# Patient Record
Sex: MALE | Race: WHITE | Employment: UNEMPLOYED | URBAN - METROPOLITAN AREA
[De-identification: names, ages, dates, MRNs, and addresses within clinical notes are randomized per-mention and may not be internally consistent; named-entity substitution may affect disease eponyms.]

---

## 2019-06-25 ENCOUNTER — TRANSFERRED RECORDS (OUTPATIENT)
Dept: HEALTH INFORMATION MANAGEMENT | Facility: CLINIC | Age: 54
End: 2019-06-25

## 2019-09-19 ENCOUNTER — HOSPITAL ENCOUNTER (INPATIENT)
Facility: CLINIC | Age: 54
LOS: 5 days | Discharge: ANOTHER HOSPITAL | DRG: 091 | End: 2019-09-24
Attending: NEUROLOGICAL SURGERY | Admitting: NEUROLOGICAL SURGERY
Payer: COMMERCIAL

## 2019-09-19 DIAGNOSIS — R25.2 SPASTICITY: Primary | ICD-10-CM

## 2019-09-19 LAB
ALBUMIN SERPL-MCNC: 3.3 G/DL (ref 3.4–5)
ALBUMIN UR-MCNC: NEGATIVE MG/DL
ALP SERPL-CCNC: 85 U/L (ref 40–150)
ALT SERPL W P-5'-P-CCNC: 32 U/L (ref 0–70)
AMORPH CRY #/AREA URNS HPF: ABNORMAL /HPF
ANION GAP SERPL CALCULATED.3IONS-SCNC: 7 MMOL/L (ref 3–14)
APPEARANCE UR: CLEAR
AST SERPL W P-5'-P-CCNC: 15 U/L (ref 0–45)
BILIRUB SERPL-MCNC: 0.4 MG/DL (ref 0.2–1.3)
BILIRUB UR QL STRIP: NEGATIVE
BUN SERPL-MCNC: 11 MG/DL (ref 7–30)
CALCIUM SERPL-MCNC: 9 MG/DL (ref 8.5–10.1)
CHLORIDE SERPL-SCNC: 106 MMOL/L (ref 94–109)
CO2 SERPL-SCNC: 24 MMOL/L (ref 20–32)
COLOR UR AUTO: ABNORMAL
CREAT SERPL-MCNC: 0.68 MG/DL (ref 0.66–1.25)
CRP SERPL-MCNC: 42 MG/L (ref 0–8)
ERYTHROCYTE [DISTWIDTH] IN BLOOD BY AUTOMATED COUNT: 13.4 % (ref 10–15)
GFR SERPL CREATININE-BSD FRML MDRD: >90 ML/MIN/{1.73_M2}
GLUCOSE SERPL-MCNC: 90 MG/DL (ref 70–99)
GLUCOSE UR STRIP-MCNC: NEGATIVE MG/DL
HCT VFR BLD AUTO: 43.5 % (ref 40–53)
HGB BLD-MCNC: 14 G/DL (ref 13.3–17.7)
HGB UR QL STRIP: NEGATIVE
INR PPP: 1.03 (ref 0.86–1.14)
KETONES UR STRIP-MCNC: NEGATIVE MG/DL
LEUKOCYTE ESTERASE UR QL STRIP: ABNORMAL
MCH RBC QN AUTO: 29.8 PG (ref 26.5–33)
MCHC RBC AUTO-ENTMCNC: 32.2 G/DL (ref 31.5–36.5)
MCV RBC AUTO: 93 FL (ref 78–100)
NITRATE UR QL: NEGATIVE
PH UR STRIP: 5.5 PH (ref 5–7)
PLATELET # BLD AUTO: 315 10E9/L (ref 150–450)
POTASSIUM SERPL-SCNC: 3.8 MMOL/L (ref 3.4–5.3)
PROT SERPL-MCNC: 7.5 G/DL (ref 6.8–8.8)
RBC # BLD AUTO: 4.7 10E12/L (ref 4.4–5.9)
RBC #/AREA URNS AUTO: <1 /HPF (ref 0–2)
SODIUM SERPL-SCNC: 137 MMOL/L (ref 133–144)
SOURCE: ABNORMAL
SP GR UR STRIP: 1.01 (ref 1–1.03)
SQUAMOUS #/AREA URNS AUTO: <1 /HPF (ref 0–1)
UROBILINOGEN UR STRIP-MCNC: NORMAL MG/DL (ref 0–2)
WBC # BLD AUTO: 9.8 10E9/L (ref 4–11)
WBC #/AREA URNS AUTO: 32 /HPF (ref 0–5)
WBC CLUMPS #/AREA URNS HPF: PRESENT /HPF

## 2019-09-19 PROCEDURE — 87186 SC STD MICRODIL/AGAR DIL: CPT | Performed by: NEUROLOGICAL SURGERY

## 2019-09-19 PROCEDURE — 81001 URINALYSIS AUTO W/SCOPE: CPT | Performed by: NURSE PRACTITIONER

## 2019-09-19 PROCEDURE — 85610 PROTHROMBIN TIME: CPT | Performed by: NEUROLOGICAL SURGERY

## 2019-09-19 PROCEDURE — 87088 URINE BACTERIA CULTURE: CPT | Performed by: NEUROLOGICAL SURGERY

## 2019-09-19 PROCEDURE — 36415 COLL VENOUS BLD VENIPUNCTURE: CPT | Performed by: NURSE PRACTITIONER

## 2019-09-19 PROCEDURE — 25000132 ZZH RX MED GY IP 250 OP 250 PS 637: Performed by: STUDENT IN AN ORGANIZED HEALTH CARE EDUCATION/TRAINING PROGRAM

## 2019-09-19 PROCEDURE — 85027 COMPLETE CBC AUTOMATED: CPT | Performed by: NURSE PRACTITIONER

## 2019-09-19 PROCEDURE — 80053 COMPREHEN METABOLIC PANEL: CPT | Performed by: NURSE PRACTITIONER

## 2019-09-19 PROCEDURE — 25000132 ZZH RX MED GY IP 250 OP 250 PS 637: Performed by: NURSE PRACTITIONER

## 2019-09-19 PROCEDURE — 12000001 ZZH R&B MED SURG/OB UMMC

## 2019-09-19 PROCEDURE — 86140 C-REACTIVE PROTEIN: CPT | Performed by: NURSE PRACTITIONER

## 2019-09-19 PROCEDURE — 87086 URINE CULTURE/COLONY COUNT: CPT | Performed by: NEUROLOGICAL SURGERY

## 2019-09-19 RX ORDER — MORPHINE SULFATE 15 MG/1
15 TABLET, FILM COATED, EXTENDED RELEASE ORAL 2 TIMES DAILY
Status: DISCONTINUED | OUTPATIENT
Start: 2019-09-19 | End: 2019-09-19

## 2019-09-19 RX ORDER — DIAZEPAM 10 MG
10 TABLET ORAL AT BEDTIME
COMMUNITY

## 2019-09-19 RX ORDER — HYDROXYZINE HYDROCHLORIDE 25 MG/1
25-50 TABLET, FILM COATED ORAL EVERY 6 HOURS PRN
Status: DISCONTINUED | OUTPATIENT
Start: 2019-09-19 | End: 2019-09-19

## 2019-09-19 RX ORDER — LIDOCAINE HYDROCHLORIDE 20 MG/ML
JELLY TOPICAL PRN
Status: ON HOLD | COMMUNITY
End: 2019-09-24

## 2019-09-19 RX ORDER — BACLOFEN 20 MG/1
20 TABLET ORAL 4 TIMES DAILY PRN
COMMUNITY

## 2019-09-19 RX ORDER — PANTOPRAZOLE SODIUM 40 MG/1
40 TABLET, DELAYED RELEASE ORAL DAILY
Status: DISCONTINUED | OUTPATIENT
Start: 2019-09-20 | End: 2019-09-24 | Stop reason: HOSPADM

## 2019-09-19 RX ORDER — NALOXONE HYDROCHLORIDE 0.4 MG/ML
.1-.4 INJECTION, SOLUTION INTRAMUSCULAR; INTRAVENOUS; SUBCUTANEOUS
Status: DISCONTINUED | OUTPATIENT
Start: 2019-09-19 | End: 2019-09-24 | Stop reason: HOSPADM

## 2019-09-19 RX ORDER — LANOLIN ALCOHOL/MO/W.PET/CERES
1000 CREAM (GRAM) TOPICAL DAILY
Status: DISCONTINUED | OUTPATIENT
Start: 2019-09-19 | End: 2019-09-19

## 2019-09-19 RX ORDER — DOXEPIN HYDROCHLORIDE 150 MG/1
150 CAPSULE ORAL AT BEDTIME
COMMUNITY

## 2019-09-19 RX ORDER — SUMATRIPTAN 100 MG/1
100 TABLET, FILM COATED ORAL 2 TIMES DAILY PRN
COMMUNITY

## 2019-09-19 RX ORDER — LACTULOSE 10 G/15ML
30 SOLUTION ORAL 2 TIMES DAILY PRN
Status: DISCONTINUED | OUTPATIENT
Start: 2019-09-19 | End: 2019-09-24 | Stop reason: HOSPADM

## 2019-09-19 RX ORDER — HYDROMORPHONE HYDROCHLORIDE 2 MG/1
6 TABLET ORAL 2 TIMES DAILY PRN
COMMUNITY

## 2019-09-19 RX ORDER — SUMATRIPTAN 100 MG/1
100 TABLET, FILM COATED ORAL 2 TIMES DAILY PRN
Status: DISCONTINUED | OUTPATIENT
Start: 2019-09-19 | End: 2019-09-24 | Stop reason: HOSPADM

## 2019-09-19 RX ORDER — DIAZEPAM 5 MG
10 TABLET ORAL AT BEDTIME
Status: DISCONTINUED | OUTPATIENT
Start: 2019-09-19 | End: 2019-09-24 | Stop reason: HOSPADM

## 2019-09-19 RX ORDER — GUAIFENESIN 600 MG/1
600 TABLET, EXTENDED RELEASE ORAL 2 TIMES DAILY
COMMUNITY

## 2019-09-19 RX ORDER — BACLOFEN 10 MG/1
20 TABLET ORAL 4 TIMES DAILY PRN
Status: DISCONTINUED | OUTPATIENT
Start: 2019-09-19 | End: 2019-09-24

## 2019-09-19 RX ORDER — TOPIRAMATE 25 MG/1
25 TABLET, FILM COATED ORAL 2 TIMES DAILY
COMMUNITY

## 2019-09-19 RX ORDER — LIDOCAINE HYDROCHLORIDE 20 MG/ML
JELLY TOPICAL DAILY PRN
Status: DISCONTINUED | OUTPATIENT
Start: 2019-09-19 | End: 2019-09-24 | Stop reason: HOSPADM

## 2019-09-19 RX ORDER — DOXEPIN HYDROCHLORIDE 50 MG/1
150 CAPSULE ORAL AT BEDTIME
Status: DISCONTINUED | OUTPATIENT
Start: 2019-09-19 | End: 2019-09-24 | Stop reason: HOSPADM

## 2019-09-19 RX ORDER — GABAPENTIN 100 MG/1
100 CAPSULE ORAL 3 TIMES DAILY
Status: DISCONTINUED | OUTPATIENT
Start: 2019-09-19 | End: 2019-09-19

## 2019-09-19 RX ORDER — POLYETHYLENE GLYCOL 3350 17 G/17G
17 POWDER, FOR SOLUTION ORAL DAILY
Status: DISCONTINUED | OUTPATIENT
Start: 2019-09-20 | End: 2019-09-24 | Stop reason: HOSPADM

## 2019-09-19 RX ORDER — LIDOCAINE 4 G/G
1 PATCH TOPICAL
Status: DISCONTINUED | OUTPATIENT
Start: 2019-09-20 | End: 2019-09-24 | Stop reason: HOSPADM

## 2019-09-19 RX ORDER — ALBUTEROL SULFATE 0.83 MG/ML
2.5 SOLUTION RESPIRATORY (INHALATION) EVERY 4 HOURS PRN
Status: DISCONTINUED | OUTPATIENT
Start: 2019-09-19 | End: 2019-09-24 | Stop reason: HOSPADM

## 2019-09-19 RX ORDER — DULOXETIN HYDROCHLORIDE 60 MG/1
60 CAPSULE, DELAYED RELEASE ORAL DAILY
Status: DISCONTINUED | OUTPATIENT
Start: 2019-09-20 | End: 2019-09-24 | Stop reason: HOSPADM

## 2019-09-19 RX ORDER — ACETAMINOPHEN 325 MG/1
325-650 TABLET ORAL EVERY 6 HOURS PRN
Status: DISCONTINUED | OUTPATIENT
Start: 2019-09-19 | End: 2019-09-24 | Stop reason: HOSPADM

## 2019-09-19 RX ORDER — MULTIPLE VITAMINS W/ MINERALS TAB 9MG-400MCG
1 TAB ORAL DAILY
Status: DISCONTINUED | OUTPATIENT
Start: 2019-09-20 | End: 2019-09-24 | Stop reason: HOSPADM

## 2019-09-19 RX ORDER — AMOXICILLIN 250 MG
1 CAPSULE ORAL EVERY EVENING
COMMUNITY

## 2019-09-19 RX ORDER — LIDOCAINE 4 G/G
1 PATCH TOPICAL EVERY 24 HOURS
Status: ON HOLD | COMMUNITY
End: 2019-09-24

## 2019-09-19 RX ORDER — OMEPRAZOLE/SODIUM BICARBONATE 20MG-1.1G
1 CAPSULE ORAL DAILY
Status: DISCONTINUED | OUTPATIENT
Start: 2019-09-19 | End: 2019-09-19

## 2019-09-19 RX ORDER — GUAIFENESIN 600 MG/1
600 TABLET, EXTENDED RELEASE ORAL 2 TIMES DAILY
Status: DISCONTINUED | OUTPATIENT
Start: 2019-09-19 | End: 2019-09-24 | Stop reason: HOSPADM

## 2019-09-19 RX ORDER — ZOLPIDEM TARTRATE 5 MG/1
5 TABLET ORAL AT BEDTIME
Status: DISCONTINUED | OUTPATIENT
Start: 2019-09-19 | End: 2019-09-19

## 2019-09-19 RX ORDER — OXYCODONE HYDROCHLORIDE 10 MG/1
10-15 TABLET ORAL EVERY 4 HOURS PRN
Status: DISCONTINUED | OUTPATIENT
Start: 2019-09-19 | End: 2019-09-19

## 2019-09-19 RX ORDER — AMOXICILLIN 250 MG
1 CAPSULE ORAL EVERY EVENING
Status: DISCONTINUED | OUTPATIENT
Start: 2019-09-19 | End: 2019-09-19

## 2019-09-19 RX ORDER — LACTULOSE 20 G/30ML
30 SOLUTION ORAL 2 TIMES DAILY PRN
COMMUNITY

## 2019-09-19 RX ORDER — PANTOPRAZOLE SODIUM 40 MG/1
40 TABLET, DELAYED RELEASE ORAL DAILY
COMMUNITY

## 2019-09-19 RX ORDER — DANTROLENE SODIUM 25 MG/1
25 CAPSULE ORAL 2 TIMES DAILY
Status: DISCONTINUED | OUTPATIENT
Start: 2019-09-19 | End: 2019-09-19

## 2019-09-19 RX ORDER — BISACODYL 10 MG
10 SUPPOSITORY, RECTAL RECTAL DAILY PRN
Status: DISCONTINUED | OUTPATIENT
Start: 2019-09-19 | End: 2019-09-24 | Stop reason: HOSPADM

## 2019-09-19 RX ORDER — TRAZODONE HYDROCHLORIDE 50 MG/1
50 TABLET, FILM COATED ORAL AT BEDTIME
Status: DISCONTINUED | OUTPATIENT
Start: 2019-09-19 | End: 2019-09-19

## 2019-09-19 RX ORDER — AMOXICILLIN 250 MG
1 CAPSULE ORAL EVERY EVENING
Status: DISCONTINUED | OUTPATIENT
Start: 2019-09-19 | End: 2019-09-24 | Stop reason: HOSPADM

## 2019-09-19 RX ORDER — TOPIRAMATE 25 MG/1
25 TABLET, FILM COATED ORAL 2 TIMES DAILY
Status: DISCONTINUED | OUTPATIENT
Start: 2019-09-19 | End: 2019-09-24 | Stop reason: HOSPADM

## 2019-09-19 RX ADMIN — DOXEPIN HYDROCHLORIDE 150 MG: 50 CAPSULE ORAL at 21:22

## 2019-09-19 RX ADMIN — SENNOSIDES AND DOCUSATE SODIUM 1 TABLET: 8.6; 5 TABLET ORAL at 21:22

## 2019-09-19 RX ADMIN — BACLOFEN 20 MG: 10 TABLET ORAL at 22:08

## 2019-09-19 RX ADMIN — DIAZEPAM 10 MG: 5 TABLET ORAL at 21:22

## 2019-09-19 RX ADMIN — HYDROMORPHONE HYDROCHLORIDE 6 MG: 4 TABLET ORAL at 18:26

## 2019-09-19 RX ADMIN — GUAIFENESIN 600 MG: 600 TABLET ORAL at 21:22

## 2019-09-19 RX ADMIN — TOPIRAMATE 25 MG: 25 TABLET, FILM COATED ORAL at 21:24

## 2019-09-19 RX ADMIN — ACETAMINOPHEN 650 MG: 325 TABLET, FILM COATED ORAL at 18:26

## 2019-09-19 ASSESSMENT — ACTIVITIES OF DAILY LIVING (ADL)
ADLS_ACUITY_SCORE: 29
ADLS_ACUITY_SCORE: 29

## 2019-09-19 ASSESSMENT — MIFFLIN-ST. JEOR: SCORE: 1825.15

## 2019-09-19 NOTE — H&P
[facility]       NEUROSURGERY H&P NOTE    HPI: Osvaldo Baxter is a 53 year old man with a past medical history of cervical spinal cord injury in 2005 causing tetraplegia and spasticity s/p baclofen pump placement around that time. He has had multiple recent revisions who was [facility] concerns of possible baclofen withdrawal. At baseline he has spasticity in his right arm and leg.    For the past 18 months he has noticed increased spasticity. He originally presented to an outside hospital in [place] May of this year where they were concerned of baclofen withdrawal and interrogated the pump. They deemed it necessary to revise the pump. The pump itself was replaced but the original catheter was left in place. He continued to have increased spasticity as well as a UTI that led to sepsis during that hospitalization. Eventually, when he was medically stabilized, [facility] [place] [facility] were concerns at the [facility] of continued [facility] withdrawal. Several studies were done to assess the integrity of the pump including dye studies that showed the pump was working and the catheter was patent allowing intrathecal administration of the contents in the pump. For concerns of continued increased spasticity, he was taken to the OR on 9/10/19 where his IT catheter was interrogated, the pump aspirated CSF and the tubing appeared to be intact. There was no replacement of the catheter at that time. The [facility] and a transfer here was set up to assess for baclofen withdrawal as well as possible replacement of the pump catheter.    He states he has increased spasticity in his right leg. He states he never used to have spasticity in his left leg but more recently has spasticity that is significant in his left leg. He has his baseline spasticity in his right arm and no subjective  "spasticity in his left arm. He states his back hurts and now has spasticity though he never had spasticity in his back before. He denies itching, or trouble with temperature regulation. He states his only symptoms are his increased \"tone\" which has more recently aggravated his sciatica in his left leg.    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 cruciate ligament 4/1/2013     Quadriplegia (H)      Tear of PCL (posterior cruciate ligament) of knee 4/1/2013       PAST SURGICAL HISTORY:   Past Surgical History:   Procedure Laterality Date     ARTHROSCOPIC RECONSTRUCTION ANTERIOR CRUCIATE LIGAMENT  4/9/2013    Procedure: ARTHROSCOPIC RECONSTRUCTION ANTERIOR CRUCIATE LIGAMENT;  Right Knee Arthroscopic Posterior Cruciate Ligament Reconstruction     ;  Surgeon: Gideon Lloyd MD;  Location:  OR     BACK SURGERY      X 2     EYE SURGERY      RIGHT EYE     INSERT PUMP BACLOFEN       RECESSION RESECTION (REPAIR STRABISMUS) BILATERAL  11/1/2012    Procedure: RECESSION RESECTION (REPAIR STRABISMUS) BILATERAL;  Strabismus Repair Left Eye;  Surgeon: Estrella Cruz MD;  Location:  OR       FAMILY HISTORY: No family history on file.    SOCIAL HISTORY:   Social History     Tobacco Use     Smoking status: Never Smoker   Substance Use Topics     Alcohol use: Yes     Comment: OCCASSIONALLY 4-5 DRINKS PER MONTH       MEDICATIONS:  Medications Prior to Admission   Medication Sig Dispense Refill Last Dose     acetaminophen (TYLENOL) 325 MG tablet Take 1-2 tablets by mouth every 6 hours as needed. 250 tablet 0      albuterol (2.5 MG/3ML) 0.083% nebulizer solution Take 3 mLs by nebulization every 4 hours as needed (dyspnea). 1 Box       baclofen (LIORESAL) 0.05 MG/ML injection 25 mcg by Intrathecal route continuous.     11/1/2012 at Unknown     benzocaine (ANBESOL) 20 % LIQD Place  rectally daily as needed. " Enema given every morning.    4/9/2013 at 0745     bisacodyl (DULCOLAX) 10 MG suppository Place 1 suppository rectally daily as needed. 12 suppository       Cephalexin (KEFLEX PO) Take  by mouth.        cyanocolbalamin (VITAMIN  B-12) 1000 MCG tablet Take 1,000 mcg by mouth daily.   4/9/2013 at 0730     DANTROLENE SODIUM PO Take 25 mg by mouth 2 times daily. Indications: Muscle Spasticity   4/9/2013 at 0730     docusate sodium (ENEMEEZ) 283 MG enema Place 1 enema rectally daily. 30 each       DOCUSATE SODIUM PO Take 100 mg by mouth daily.     4/8/2013 at 2030     DULOXETINE HCL PO Take 60 mg by mouth daily.     4/9/2013 at 0730     gabapentin (NEURONTIN) 100 MG capsule Take 1 capsule by mouth 3 times daily. 270 capsule 0      hydrOXYzine (ATARAX) 25 MG tablet Take 1-2 tablets by mouth every 6 hours as needed (adjuvant pain). 120 tablet 0      lidocaine (XYLOCAINE) 5 % ointment Apply  topically as needed.     Past Week at Unknown     morphine (MS CONTIN) 15 MG 12 hr tablet Take 15 mg by mouth 2 times daily.   4/9/2013 at 0730     Multiple Vitamin (MULTI-VITAMIN) per tablet Take 1 tablet by mouth daily.     4/9/2013 at 0730     nystatin (MYCOSTATIN) 311961 UNIT/ML suspension Take 100,000 Units by mouth as needed.     Past Month at Unknown     Omeprazole-Sodium Bicarbonate  MG CAPS Take 1 capsule by mouth daily.     4/8/2013 at 0730     oxyCODONE (ROXICODONE) 5 MG immediate release tablet Take 2-3 tablets by mouth every 4 hours as needed for pain. 180 tablet 0      polyethylene glycol (MIRALAX) powder Take 1 capful by mouth as needed.   Past Month at Unknown     TRAZODONE HCL PO Take 50 mg by mouth At Bedtime.     4/8/2013 at 2030     VITAMIN D, CHOLECALCIFEROL, PO Take 1,000 Units by mouth daily.     4/9/2013 at 0730     WARFARIN SODIUM PO Take 2.5 mg by mouth. Every Mon/Wed/Fri 4/1/2013     WARFARIN SODIUM PO Take 5 mg by mouth daily. Take every Sat/Sun/Tues/Thursday or as directed by Warfarin Clinic     4/3/13     Warfarin Therapy Reminder 1 each continuous prn. 1 each 0      ZOLPIDEM TARTRATE PO Take 5 mg by mouth At Bedtime. 1-2 at bedtime    4/8/2013 at 2030       Allergies:  No Known Allergies    ROS: 10 point ROS of systems including Constitutional, Eyes, Respiratory, Cardiovascular, Gastroenterology, Genitourinary, Integumentary, Muscularskeletal, Psychiatric were all noncontributory, or are his baseline, except for pertinent positives noted in my HPI.    Physical exam:   Blood pressure 106/77, temperature 98.1  F (36.7  C), temperature source Oral, resp. rate 18, SpO2 98 %.  CV: RRR  PULM: breathing comfortably on room air  NEUROLOGIC:  -- Awake; Alert; oriented x 3  -- Follows commands briskly  -- Speech soft and airy, no aphasia  -- no gaze preference. No apparent hemineglect.  Cranial Nerves:  -- PERRL bilat, extraocular movements intact  -- Right sided facial weakness, able to blink with right eye but cannot raise right eyebrow or right lip  -- hearing decreased in right ear due to history of trauma to the ear    Motor:  Modified Lakhwinder Scale: 4 in LLE, 3 in RLE, 3 in RUE, 1 in LUE  He is 2-3/5 in RUE which is his baseline, 5/5 in LUE, 0-1/5 in RLE, 2-3/5 in LLE    Reflexes:  2+ in left brachial and left biceps, 0 in left brachial, left biceps, right and left patellar.      LABS:  Last Comprehensive Metabolic Panel:  Sodium   Date Value Ref Range Status   04/14/2013 141 133 - 144 mmol/L Final     Potassium   Date Value Ref Range Status   04/14/2013 3.9 3.4 - 5.3 mmol/L Final     Chloride   Date Value Ref Range Status   04/14/2013 106 94 - 109 mmol/L Final     Carbon Dioxide   Date Value Ref Range Status   04/14/2013 24 20 - 32 mmol/L Final     Anion Gap   Date Value Ref Range Status   04/14/2013 11.1 6 - 17 mmol/L Final     Glucose   Date Value Ref Range Status   04/14/2013 97 60 - 99 mg/dL Final     Urea Nitrogen   Date Value Ref Range Status   04/14/2013 7 5 - 24 mg/dL Final     Creatinine   Date  Value Ref Range Status   04/16/2013 0.80 0.66 - 1.25 mg/dL Final     GFR Estimate   Date Value Ref Range Status   04/16/2013 >90 >60 mL/min/1.7m2 Final     Calcium   Date Value Ref Range Status   04/14/2013 8.4 (L) 8.5 - 10.4 mg/dL Final     Lab Results   Component Value Date    WBC 9.4 04/14/2013     Lab Results   Component Value Date    RBC 4.19 04/14/2013     Lab Results   Component Value Date    HGB 12.9 04/14/2013     Lab Results   Component Value Date    HCT 38.5 04/14/2013     Lab Results   Component Value Date    MCV 92 04/14/2013     Lab Results   Component Value Date    MCH 30.8 04/14/2013     Lab Results   Component Value Date    MCHC 33.5 04/14/2013     Lab Results   Component Value Date    RDW 14.1 04/14/2013     Lab Results   Component Value Date     04/14/2013         IMAGING:  No recent imaging in our PACS system     ASSESSMENT:  53 year old male who presents with increased rigidity and spasticity in his extremities, more marked in his lower extremities, with no other signs to suggest baclofen withdrawal.    PLAN:  Will interrogate baclofen pump, will involve [employer] rep for more in depth diagnostics  Further assessment of the pump will depend on what is found when interrogating his pump  Will likely get a contrast study pending pump interrogation  Consult PM&R for Baclofen pump management as well  Hold anticoagulation  Basic labs ordered  Further plan pending pump assessment and further discussion with neurosurgery team    The patient was discussed with Dr. Conde, neurosurgery chief resident, and they agree with the above.    Dorothy Deutsch MD  Neurosurgery Resident, PGY-1    I have reviewed the history above and agree with the resident's assessment and plan.  KELTON Jeffrey MD

## 2019-09-19 NOTE — PLAN OF CARE
[facility] V[facility]. C/O lower back pain, pt repositioned. O x 4, whispers. LUE 3/5 all others 1/5. Reported numbness in RUE and LLE. Slight right facial droop. Back dressing CDI.  [place] with good output, per previous RN had BM today.  NPO since last night (did have sips of water around 1400, NP aware). Able to make needs known. MD came to see patient, awaiting orders at this time.

## 2019-09-19 NOTE — PHARMACY-ADMISSION MEDICATION HISTORY
Admission medication history interview status for the 9/19/2019 admission is complete. See Epic admission navigator for allergy information, pharmacy, prior to admission medications and immunization status.     Medication history interview sources:  [facility]    Changes made to PTA medication list (reason)  Added: Baclofen PO, diazepam, doxepin, guaifenesin, hydromorphone, lactulose, lidocaine patch, miconazole powder, nitroglycerin ointment, pantoprazole, phenylephrine suppository, Proctofoam, Ocean spray, sumatriptan, topiramate, enoxaparin  Deleted: Zolpidem, 2 duplicate warfarin entries, benzocaine rectal liquid, cephalexin, B12, dantrolene, gabapentin, hydroxyzine, MS Contin, nystatin suspension, omeprazole-sodium bicarb, oxycodone, trazodone.  Changed: Docusate to senna+docusate, updated baclofen pump information based on [facility] records, docusate enema from every day to every day PRN, lidocaine 5% ointment to 2% jelly, Miralax from PRN to every day, cholecalciferol 1000 units every day to 2000 units every day     Additional medication history information (including reliability of information, actions taken by pharmacist): Informed RN to let resident know to consult PM & R for baclofen pump interrogation. Warfarin is currently on hold.      Prior to Admission medications    Medication Sig Last Dose Taking? Auth Provider   acetaminophen (TYLENOL) 325 MG tablet Take 1-2 tablets by mouth every 6 hours as needed.  Yes Colten Goode MD   baclofen (LIORESAL) 20 MG tablet Take 20 mg by mouth 4 times daily as needed for muscle spasms (Breakthrough spasms)  Yes Unknown, Entered By History   diazepam (VALIUM) 10 MG tablet Take 10 mg by mouth At Bedtime 9/18/2019 at Unknown time Yes Unknown, Entered By History   docusate sodium (ENEMEEZ) 283 MG enema Place 1 enema rectally daily.  Patient taking differently: Place 1 enema rectally daily as needed for constipation   Yes Neil Rader MD   doxepin HCl  (SINEQUAN) 150 MG capsule Take 150 mg by mouth At Bedtime 9/18/2019 at Unknown time Yes Unknown, Entered By History   DULOXETINE HCL PO Take 60 mg by mouth daily.   9/19/2019 at Unknown time Yes Reported, Patient   enoxaparin (LOVENOX) 80 MG/0.8ML syringe Inject 80 mg Subcutaneous every 12 hours 9/19/2019 at 0830 Yes Unknown, Entered By History   guaiFENesin (MUCINEX) 600 MG 12 hr tablet Take 600 mg by mouth 2 times daily 9/19/2019 at x1 Yes Unknown, Entered By History   HYDROmorphone (DILAUDID) 2 MG tablet Take 6 mg by mouth 2 times daily as needed for severe pain  Yes Unknown, Entered By History   lactulose 20 GM/30ML SOLN Take 30 mLs by mouth 2 times daily as needed  Yes Unknown, Entered By History   Lidocaine (LIDOCARE) 4 % Patch Place 1 patch onto the skin every 24 hours To prevent lidocaine toxicity, patient should be patch free for 12 hrs daily. Apply to lower back 9/19/2019 at am Yes Unknown, Entered By History   lidocaine (XYLOCAINE) 2 % external gel Apply topically as needed for moderate pain (Urethra or rectum PRN)  Yes Unknown, Entered By History   MEDICATION GIVEN BY INTRATHECAL PUMP - INSTRUCTION continuous prn As of 9/19/19 rate was up to 125 mcg/day. Strength = 1000 mcg/mL BACLOFEN 9/19/2019 at Unknown time Yes Unknown, Entered By History   miconazole (MICATIN) 2 % AERP powder Apply topically 2 times daily as needed  Yes Unknown, Entered By History   Multiple Vitamin (MULTI-VITAMIN) per tablet Take 1 tablet by mouth daily.   9/19/2019 at Unknown time Yes Reported, Patient   nitroGLYcerin (NITRO-BID) 2 % OINT ointment Place 1 inch onto the skin as needed for chest pain  Yes Unknown, Entered By History   pantoprazole (PROTONIX) 40 MG EC tablet Take 40 mg by mouth daily 9/19/2019 at Unknown time Yes Unknown, Entered By History   phenylephrine (TRONOLANE) 0.25 % suppository Place 1 suppository rectally daily as needed for hemorrhoids  Yes Unknown, Entered By History   polyethylene glycol (MIRALAX) powder  Take 1 capful by mouth daily  9/19/2019 at Unknown time Yes Reported, Patient   pramoxine - hydrocortisone (PROCTOFOAM-hc) 1 % foam 2 times daily 9/19/2019 at x1 Yes Unknown, Entered By History   senna-docusate (SENOKOT-S/PERICOLACE) 8.6-50 MG tablet Take 1 tablet by mouth every evening 9/18/2019 at Unknown time Yes Unknown, Entered By History   sodium chloride (OCEAN) 0.65 % nasal spray Spray 2 sprays into both nostrils every 2 hours as needed for congestion  Yes Unknown, Entered By History   SUMAtriptan (IMITREX) 100 MG tablet Take 100 mg by mouth 2 times daily as needed for migraine  Yes Unknown, Entered By History   topiramate (TOPAMAX) 25 MG tablet Take 25 mg by mouth 2 times daily 9/19/2019 at x1 Yes Unknown, Entered By History   VITAMIN D, CHOLECALCIFEROL, PO Take 2,000 Units by mouth daily  9/19/2019 at Unknown time Yes Reported, Patient   albuterol (2.5 MG/3ML) 0.083% nebulizer solution Take 3 mLs by nebulization every 4 hours as needed (dyspnea). Unknown at Unknown time  Neil Rader MD   bisacodyl (DULCOLAX) 10 MG suppository Place 1 suppository rectally daily as needed. Unknown at Unknown time  Neil Rader MD   Warfarin Therapy Reminder 1 each continuous prn.   Colten Goode MD         Medication history completed by: Whitley South, Rome[place]

## 2019-09-20 ENCOUNTER — APPOINTMENT (OUTPATIENT)
Dept: PHYSICAL THERAPY | Facility: CLINIC | Age: 54
DRG: 091 | End: 2019-09-20
Attending: NURSE PRACTITIONER
Payer: COMMERCIAL

## 2019-09-20 ENCOUNTER — APPOINTMENT (OUTPATIENT)
Dept: CT IMAGING | Facility: CLINIC | Age: 54
DRG: 091 | End: 2019-09-20
Attending: NEUROLOGICAL SURGERY
Payer: COMMERCIAL

## 2019-09-20 ENCOUNTER — APPOINTMENT (OUTPATIENT)
Dept: OCCUPATIONAL THERAPY | Facility: CLINIC | Age: 54
DRG: 091 | End: 2019-09-20
Attending: NURSE PRACTITIONER
Payer: COMMERCIAL

## 2019-09-20 LAB
ANION GAP SERPL CALCULATED.3IONS-SCNC: 8 MMOL/L (ref 3–14)
BASOPHILS # BLD AUTO: 0.2 10E9/L (ref 0–0.2)
BASOPHILS NFR BLD AUTO: 1.7 %
BUN SERPL-MCNC: 12 MG/DL (ref 7–30)
CALCIUM SERPL-MCNC: 8.9 MG/DL (ref 8.5–10.1)
CHLORIDE SERPL-SCNC: 106 MMOL/L (ref 94–109)
CO2 SERPL-SCNC: 24 MMOL/L (ref 20–32)
CREAT SERPL-MCNC: 0.86 MG/DL (ref 0.66–1.25)
DIFFERENTIAL METHOD BLD: NORMAL
EOSINOPHIL # BLD AUTO: 0.7 10E9/L (ref 0–0.7)
EOSINOPHIL NFR BLD AUTO: 7.2 %
ERYTHROCYTE [DISTWIDTH] IN BLOOD BY AUTOMATED COUNT: 13.6 % (ref 10–15)
GFR SERPL CREATININE-BSD FRML MDRD: >90 ML/MIN/{1.73_M2}
GLUCOSE SERPL-MCNC: 85 MG/DL (ref 70–99)
HCT VFR BLD AUTO: 41.7 % (ref 40–53)
HGB BLD-MCNC: 13.3 G/DL (ref 13.3–17.7)
IMM GRANULOCYTES # BLD: 0 10E9/L (ref 0–0.4)
IMM GRANULOCYTES NFR BLD: 0.3 %
LYMPHOCYTES # BLD AUTO: 4.6 10E9/L (ref 0.8–5.3)
LYMPHOCYTES NFR BLD AUTO: 45.3 %
MAGNESIUM SERPL-MCNC: 2.2 MG/DL (ref 1.6–2.3)
MCH RBC QN AUTO: 29.8 PG (ref 26.5–33)
MCHC RBC AUTO-ENTMCNC: 31.9 G/DL (ref 31.5–36.5)
MCV RBC AUTO: 93 FL (ref 78–100)
MONOCYTES # BLD AUTO: 1 10E9/L (ref 0–1.3)
MONOCYTES NFR BLD AUTO: 9.8 %
NEUTROPHILS # BLD AUTO: 3.6 10E9/L (ref 1.6–8.3)
NEUTROPHILS NFR BLD AUTO: 35.7 %
NRBC # BLD AUTO: 0 10*3/UL
NRBC BLD AUTO-RTO: 0 /100
PHOSPHATE SERPL-MCNC: 3.6 MG/DL (ref 2.5–4.5)
PLATELET # BLD AUTO: 335 10E9/L (ref 150–450)
POTASSIUM SERPL-SCNC: 4 MMOL/L (ref 3.4–5.3)
RADIOLOGIST FLAGS: NORMAL
RBC # BLD AUTO: 4.47 10E12/L (ref 4.4–5.9)
SODIUM SERPL-SCNC: 138 MMOL/L (ref 133–144)
WBC # BLD AUTO: 10.1 10E9/L (ref 4–11)

## 2019-09-20 PROCEDURE — 25000132 ZZH RX MED GY IP 250 OP 250 PS 637: Performed by: STUDENT IN AN ORGANIZED HEALTH CARE EDUCATION/TRAINING PROGRAM

## 2019-09-20 PROCEDURE — 80048 BASIC METABOLIC PNL TOTAL CA: CPT | Performed by: STUDENT IN AN ORGANIZED HEALTH CARE EDUCATION/TRAINING PROGRAM

## 2019-09-20 PROCEDURE — 36415 COLL VENOUS BLD VENIPUNCTURE: CPT | Performed by: STUDENT IN AN ORGANIZED HEALTH CARE EDUCATION/TRAINING PROGRAM

## 2019-09-20 PROCEDURE — 72131 CT LUMBAR SPINE W/O DYE: CPT

## 2019-09-20 PROCEDURE — 97165 OT EVAL LOW COMPLEX 30 MIN: CPT | Mod: GO

## 2019-09-20 PROCEDURE — 97110 THERAPEUTIC EXERCISES: CPT | Mod: GO

## 2019-09-20 PROCEDURE — 83735 ASSAY OF MAGNESIUM: CPT | Performed by: STUDENT IN AN ORGANIZED HEALTH CARE EDUCATION/TRAINING PROGRAM

## 2019-09-20 PROCEDURE — 97530 THERAPEUTIC ACTIVITIES: CPT | Mod: GO

## 2019-09-20 PROCEDURE — 25000132 ZZH RX MED GY IP 250 OP 250 PS 637: Performed by: NURSE PRACTITIONER

## 2019-09-20 PROCEDURE — 97161 PT EVAL LOW COMPLEX 20 MIN: CPT | Mod: GP

## 2019-09-20 PROCEDURE — 12000001 ZZH R&B MED SURG/OB UMMC

## 2019-09-20 PROCEDURE — 84100 ASSAY OF PHOSPHORUS: CPT | Performed by: STUDENT IN AN ORGANIZED HEALTH CARE EDUCATION/TRAINING PROGRAM

## 2019-09-20 PROCEDURE — 74150 CT ABDOMEN W/O CONTRAST: CPT

## 2019-09-20 PROCEDURE — 85025 COMPLETE CBC W/AUTO DIFF WBC: CPT | Performed by: STUDENT IN AN ORGANIZED HEALTH CARE EDUCATION/TRAINING PROGRAM

## 2019-09-20 PROCEDURE — 97530 THERAPEUTIC ACTIVITIES: CPT | Mod: GP

## 2019-09-20 RX ORDER — NITROFURANTOIN 25; 75 MG/1; MG/1
100 CAPSULE ORAL EVERY 12 HOURS SCHEDULED
Status: DISCONTINUED | OUTPATIENT
Start: 2019-09-20 | End: 2019-09-24 | Stop reason: HOSPADM

## 2019-09-20 RX ADMIN — DIAZEPAM 10 MG: 5 TABLET ORAL at 22:44

## 2019-09-20 RX ADMIN — NITROFURANTOIN (MONOHYDRATE/MACROCRYSTALS) 100 MG: 75; 25 CAPSULE ORAL at 22:44

## 2019-09-20 RX ADMIN — PRAMOXINE HYDROCHLORIDE HYDROCORTISONE ACETATE: 100; 100 AEROSOL, FOAM TOPICAL at 22:44

## 2019-09-20 RX ADMIN — GUAIFENESIN 600 MG: 600 TABLET ORAL at 08:05

## 2019-09-20 RX ADMIN — PANTOPRAZOLE SODIUM 40 MG: 40 TABLET, DELAYED RELEASE ORAL at 08:05

## 2019-09-20 RX ADMIN — DOCUSATE SODIUM 1 ENEMA: 283 LIQUID RECTAL at 08:45

## 2019-09-20 RX ADMIN — BACLOFEN 20 MG: 10 TABLET ORAL at 11:55

## 2019-09-20 RX ADMIN — TOPIRAMATE 25 MG: 25 TABLET, FILM COATED ORAL at 08:04

## 2019-09-20 RX ADMIN — DULOXETINE HYDROCHLORIDE 60 MG: 60 CAPSULE, DELAYED RELEASE ORAL at 08:04

## 2019-09-20 RX ADMIN — MELATONIN 2000 UNITS: at 08:04

## 2019-09-20 RX ADMIN — BISACODYL 10 MG: 10 SUPPOSITORY RECTAL at 21:19

## 2019-09-20 RX ADMIN — DOXEPIN HYDROCHLORIDE 150 MG: 50 CAPSULE ORAL at 22:45

## 2019-09-20 RX ADMIN — GUAIFENESIN 600 MG: 600 TABLET ORAL at 22:45

## 2019-09-20 RX ADMIN — MULTIPLE VITAMINS W/ MINERALS TAB 1 TABLET: TAB at 08:04

## 2019-09-20 RX ADMIN — HYDROMORPHONE HYDROCHLORIDE 6 MG: 4 TABLET ORAL at 06:40

## 2019-09-20 RX ADMIN — NITROFURANTOIN (MONOHYDRATE/MACROCRYSTALS) 100 MG: 75; 25 CAPSULE ORAL at 08:04

## 2019-09-20 RX ADMIN — BACLOFEN 20 MG: 10 TABLET ORAL at 04:57

## 2019-09-20 RX ADMIN — TOPIRAMATE 25 MG: 25 TABLET, FILM COATED ORAL at 22:45

## 2019-09-20 RX ADMIN — HYDROMORPHONE HYDROCHLORIDE 6 MG: 4 TABLET ORAL at 16:53

## 2019-09-20 ASSESSMENT — ACTIVITIES OF DAILY LIVING (ADL)
ADLS_ACUITY_SCORE: 25
ADLS_ACUITY_SCORE: 27
ADLS_ACUITY_SCORE: 26
ADLS_ACUITY_SCORE: 27

## 2019-09-20 NOTE — PROGRESS NOTES
"SPIRITUAL HEALTH SERVICES  SPIRITUAL ASSESSMENT Progress Note  [facility] 6A     PRIMARY FOCUS:     Goals of care    Symptom/pain management    Emotional/spiritual/Temple distress    Support for coping    REFERRAL SOURCE: patient request    ILLNESS CIRCUMSTANCES:   Reviewed documentation. Reflective conversation shared with Osvaldo which integrated elements of illness and family narratives.     Context of Serious Illness/Symptom(s) - Osvaldo was frustrated with past care he had received at other hospitals. We talked at length about how this is affecting him. He expressed being happier at [facility] than the hospitals of the past.     Resources for Support - Osvaldo mentioned his sister, daughter, and grandchildren as sources of support. 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     DISTRESS:     Emotional/Spiritual/Existential Distress - Osvaldo is an [employer]  of 22 years. He expressed a desire to still be in the [employer] and frustration with his current medical situation.    Evangelical Distress - Not discussed    Social/Cultural/Economic Distress - Not discussed    SPIRITUAL/Orthodox COPING:     Yazidi/Nishi - Not discussed    Spiritual Practice(s) - Osvaldo asked twice that I keep him in my prayers, indicating that prayer is something that is important to him.     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 He discussed his desire to be with his family in [place] saying, \"hospital visits are all I can have right now.\" He has a sister, daughter, and grandchildren. From the way he spoke of them, I would guess them to be sources of tamanna and strength.     GOALS OF CARE:    Goals of Care - Osvaldo wishes to get better quickly so that he can return to the family ranch in [place].    Meaning/Sense-Making - Osvaldo places importance on his time in the . Being a "  is very important to him, and he is grieving the loss of his place in the [employer].     PLAN: Patient will request for a  if desired.    Alexandra Villagran  Chaplain Resident  Pager [phone]

## 2019-09-20 NOTE — PROGRESS NOTES
09/20/19 1000   Quick Adds   Type of Visit Initial PT Evaluation  (Henrry Merchant, [facility])       Present no   Language English   Living Environment   Lives With friend(s)   Living Arrangements house  (single story)   Home Accessibility wheelchair accessible;stairs to enter home  (1 step and ramp )   Number of Stairs, Main Entrance 1   Stair Railings, Main Entrance railings on both sides of stairs   Transportation Anticipated family or friend will provide   Living Environment Comment Wheelchair accessible home    Self-Care   Usual Activity Tolerance good   Current Activity Tolerance poor   Regular Exercise No   Equipment Currently Used at Home walker, elsa;wheelchair, manual;wheelchair, power   Functional Level Prior   Ambulation 1-->assistive equipment   Transferring 1-->assistive equipment   Toileting 2-->assistive person   Bathing 3-->assistive equipment and person   Communication 0-->understands/communicates without difficulty   Swallowing 0-->swallows foods/liquids without difficulty   Cognition 0 - no cognition issues reported   Fall history within last six months no   Which of the above functional risks had a recent onset or change? ambulation;transferring   Prior Functional Level Comment Pt reported being able to amb 30-80' with hemiwalker    General Information   Onset of Illness/Injury or Date of Surgery - Date 09/19/19   Referring Physician Donna Mcwilliams APRN CNP   Patient/Family Goals Statement Return to prior level of function   Pertinent History of Current Problem (include personal factors and/or comorbidities that impact the POC) 53 year old male with a past medical history of an accident causing cervical spinal cord injury in 2005 causing tetraplegia and spasticity s/p baclofen pump placement around that time with multiple recent revisions who was [facility] of possible b[facility]ofen withdrawal. At baseline he has  spasticity in his right arm and leg.   Precautions/Limitations fall precautions   General Info Comments PT reports having bouts of depression over the last 6 months    Cognitive Status Examination   Orientation orientation to person, place and time   Level of Consciousness alert   Follows Commands and Answers Questions 100% of the time   Personal Safety and Judgment intact   Memory intact   Pain Assessment   Patient Currently in Pain   (Back spasms down LE bilateral )   Integumentary/Edema   Integumentary/Edema no deficits were identifed   Posture    Posture Protracted shoulders   Range of Motion (ROM)   ROM Comment Limted UE/LE ROM due to tone and spasticity.    Strength   Strength Comments L UE 5/5 intact. R shldr 2/5 elbow 2/5 wrist 1/5. L hip 3/5, knee 2/5, ankle 2/5. R Hip 2/5 knee 1/5 ankle 1/5    Bed Mobility   Bed Mobility Comments Pt completed supine > sit with min A x2    Transfer Skills   Transfer Comments Pt completed STS with SBA-min A. Pt also completed standing pivot transfer to chair with min-mod Ax2. Pt has good transfer skills and has good compensation strategies. Pt is mostly limited by spasticity and tone in UE/LE.   Gait   Gait Comments Not tested today   Balance   Balance Comments Pt has tendency to lean/shift towards R. Pt can be unsteady at times but has good trunk control. Pt had difficulty with standing balance due to decreased strength and spasticity.    Sensory Examination   Sensory Perception no deficits were identified   Sensory Perception Comments not formally tested    Muscle Tone   Muscle Tone Comments Pt has signficant rigidity in LE R>L . Pt reports that this type of rigidity is new within the last 6 months. Pt also has rigidity in R UE .    General Therapy Interventions   Planned Therapy Interventions neuromuscular re-education;motor coordination training;fine motor coordination training;bed mobility training;balance training;strengthening;transfer training;progressive  "activity/exercise   Clinical Impression   Criteria for Skilled Therapeutic Intervention yes, treatment indicated   PT Diagnosis Impaired functional mobility    Influenced by the following impairments Decreased strength, balance, spasticity, tone, deconditioning,    Functional limitations due to impairments bed mobility, transfers, gait, ADL's,    Clinical Presentation Stable/Uncomplicated   Clinical Presentation Rationale Pt is currently getting treatment for baclofen pump malfunction. Currently he is stable   Clinical Decision Making (Complexity) Low complexity   Therapy Frequency 6x/week   Predicted Duration of Therapy Intervention (days/wks) 1 week   Anticipated Discharge Disposition Acute Rehabilitation Facility;Home with Home Therapy   Risk & Benefits of therapy have been explained Yes   Patient, Family & other staff in agreement with plan of care Yes   Clinical Impression Comments Bony complete, Tx indicated    [facility] [employer]-Social [employer] TM \"6 Clicks\"   2016, Trustees of [facility], under license to [employer].  All rights reserved.   6 Clicks Short Forms Basic Mobility Inpatient Short Form   [facility] AM-PAC  \"6 Clicks\" V.2 Basic Mobility Inpatient Short Form   1. Turning from your back to your side while in a flat bed without using bedrails? 3 - A Little   2. Moving from lying on your back to sitting on the side of a flat bed without using bedrails? 3 - A Little   3. Moving to and from a bed to a chair (including a wheelchair)? 2 - A Lot   4. Standing up from a chair using your arms (e.g., wheelchair, or bedside chair)? 2 - A Lot   5. To walk in hospital room? 1 - Total   6. Climbing 3-5 steps with a railing? 1 - Total   Basic Mobility Raw Score (Score out of 24.Lower scores equate to lower levels of function) 12   Total Evaluation Time   Total Evaluation Time (Minutes) 12     "

## 2019-09-20 NOTE — CONSULTS
[facility]   PM&R CONSULT    Consulting Provider: Dorothy Deutsch  Reason for Consult: Assessment of rehabilitation   Location of Patient: 6A   Date of Encounter: 9/20/2019   Date of Admission: 9/19/2019        ASSESSMENT/PLAN:    Mr. Osvaldo Baxter is a Right handed 53 year old yo male with history of C1 incomplete tetraplegia, who presents with increased spasticity and declined function secondary to it.   Discussed with Dr De La Cruz from neurosurgery, Sayda our nurse will bring the port access to 6A and leave it with nursing, for the NS to do the catheter dye test, to further evaluate the pump.   My interrogation with the pump, shows that it has conc of 1000 mcg/ml, is providing 125.1 mcg/day of baclofen. It is a simple continuous infusion. Alarm date is 10/15/19  Residual Volume is 5.2 ml.    Per the interrogator, the pump appears to be working, recommend further dye test to evaluate the catheter.   Sayda brought in the access port kit and we left it with his Nurse. Discussed with NS regarding further plan.       Thank you for consulting the PM&R Department.   For any questions, please feel free to page me at [phone]       Daksha Willard MD   Department of PM&R        HPI:    Mr. Osvaldo Baxter is a Right handed 53 year old yo male with history of C1 incomplete tetraplegia, who presents with increased spasticity and declined function secondary to it.   He is detailed historian, and states that he has had the ITB pump for 13 years, and that [facility] At that time he states that the catheter was not placed. He continued to have significant increase in his tone, which required a dye test. He states that kinks were found in the catheter.   He had the dose increased from 72 mcg to 225 mcg, developed AD several times during this time.   Dr Lezama is his PMR doctor. He states last encounter he had the catheter anchor repaired and loop  shortened. He notes no change in the tone.       SOCIAL HISTORY  Social History     Socioeconomic History     Marital status: Single     Spouse name: Not on file     Number of children: Not on file     Years of education: Not on file     Highest education level: Not on file   Occupational History     Not on file   Social Needs     Financial resource strain: Not on file     Food insecurity:     Worry: Not on file     Inability: Not on file     Transportation needs:     Medical: Not on file     Non-medical: Not on file   Tobacco Use     Smoking status: Never Smoker   Substance and Sexual Activity     Alcohol use: Yes     Comment: OCCASSIONALLY 4-5 DRINKS PER MONTH     Drug use: No     Sexual activity: Not on file   Lifestyle     Physical activity:     Days per week: Not on file     Minutes per session: Not on file     Stress: Not on file   Relationships     Social connections:     Talks on phone: Not on file     Gets together: Not on file     Attends Latter day service: Not on file     Active member of club or organization: Not on file     Attends meetings of clubs or organizations: Not on file     Relationship status: Not on file     Intimate partner violence:     Fear of current or ex partner: Not on file     Emotionally abused: Not on file     Physically abused: Not on file     Forced sexual activity: Not on file   Other Topics Concern     Not on file   Social History Narrative     Not on file         Past Medical History:  Past Medical History:   Diagnosis Date     Double vision      Eaton-Lambert myasthenic syndrome (H)      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 cruciate ligament 4/1/2013     Quadriplegia (H)      Tear of PCL (posterior cruciate ligament) of knee 4/1/2013       Current Medications:  Current Facility-Administered Medications   Medication     acetaminophen (TYLENOL) tablet 325-650 mg     albuterol (PROVENTIL) neb solution 2.5 mg     baclofen (LIORESAL) tablet 20 mg     bisacodyl (DULCOLAX) Suppository 10 mg      diazepam (VALIUM) tablet 10 mg     docusate sodium (ENEMEEZ) enema 1 enema     doxepin (SINEquan) capsule 150 mg     DULoxetine (CYMBALTA) DR capsule 60 mg     guaiFENesin (MUCINEX) 12 hr tablet 600 mg     hydrocortisone-pramoxine (PROCTOFOAM-HC) rectal foam     HYDROmorphone (DILAUDID) tablet 6 mg     lactulose (CHRONULAC) solution 30 mL     Lidocaine (LIDOCARE) 4 % Patch 1 patch     lidocaine (XYLOCAINE) 2 % external gel     lidocaine patch in PLACE     lidocaine patch REMOVAL     Medication given by intrathecal pump: This is NOT an order to dispense medication. For information only.     miconazole (MICATIN) 2 % powder     multivitamin w/minerals (THERA-VIT-M) tablet 1 tablet     naloxone (NARCAN) injection 0.1-0.4 mg     nitroFURantoin macrocrystal-monohydrate (MACROBID) capsule 100 mg     nitroGLYcerin (NITRO-BID) 2 % ointment 15 mg     nitroGLYcerin (NITRO-BID) ointment REMOVAL     pantoprazole (PROTONIX) EC tablet 40 mg     phenylephrine (TRONOLANE) 0.25 % Suppository 1 suppository     polyethylene glycol (MIRALAX/GLYCOLAX) Packet 17 g     senna-docusate (SENOKOT-S/PERICOLACE) 8.6-50 MG per tablet 1 tablet     sodium chloride (OCEAN) 0.65 % nasal spray 2 spray     SUMAtriptan (IMITREX) tablet 100 mg     topiramate (TOPAMAX) tablet 25 mg     vitamin D3 (CHOLECALCIFEROL) 1000 units (25 mcg) tablet 2,000 Units       Review of Systems:  Total of ten systems reviewed, pertinent positives and negatives as follows  Feels generally fatigued  Reports increased tightness in all extremities   Reports numbness   Napoles cathter in place   No chest pain. No cough or SOB.  No visual changes.   No headache or photophobia.   No nausea, or abdominal pain.  Slept poorly last night  Mood is good, denies any symptoms of depression.   Remainder of the review of the systems was negative.          Labs   Lab Results   Component Value Date    WBC 10.1 09/20/2019    HGB 13.3 09/20/2019    HCT 41.7 09/20/2019    MCV 93 09/20/2019     " 09/20/2019     Lab Results   Component Value Date     09/20/2019    POTASSIUM 4.0 09/20/2019    CHLORIDE 106 09/20/2019    CO2 24 09/20/2019    GLC 85 09/20/2019     Lab Results   Component Value Date    GFRESTIMATED >90 09/20/2019    GFRESTBLACK >90 09/20/2019     Lab Results   Component Value Date    AST 15 09/19/2019    ALT 32 09/19/2019    ALKPHOS 85 09/19/2019    BILITOTAL 0.4 09/19/2019     Lab Results   Component Value Date    INR 1.03 09/19/2019     Lab Results   Component Value Date    BUN 12 09/20/2019    CR 0.86 09/20/2019         ON EXAMINATION:  Vitals:    09/19/19 2017 09/19/19 2304 09/20/19 0400 09/20/19 0756   BP: 111/73 102/70 103/61 121/77   BP Location: Left arm Left arm Left arm Left arm   Pulse:    62   Resp: 18 18 12 14   Temp: 96.2  F (35.7  C) 96.8  F (36  C) 95.3  F (35.2  C) 96.7  F (35.9  C)   TempSrc: Oral Oral Oral Oral   SpO2: 96% 96% 97% 98%   Weight:       Height:           Physical Exam:  Blood pressure 121/77, pulse 62, temperature 96.7  F (35.9  C), temperature source Oral, resp. rate 14, height 1.803 m (5' 11\"), weight 94.8 kg (209 lb), SpO2 98 %.    GEN: NAD, seated comfortably in chair  He is alert, appropriate, cooperative  Speech is hypophonic   Comprehension is intact   HEENT: NCAT  MSK: no contractures noted   No muscle atrophy noted  ABD: soft, non tender, pos BS  NEURO:   CRANIAL NERVES: Discs flat. Pupils equal, round and reactive to light.  Extraocular movements full. Visual fields full. Face moves symmetrically.  Tongue midline. Hearing intact to finger rubbing.    Sensation: sensation to impaired to light touch throughout BLE    Strength: difficult to assess due to the spasticity    He has increased tone in the both the lower extremities.     LUE shoulder 3-/5, B/T/WF 1+/4,    RUE B/T 4/4, WF 3/4, FF 3/4    LLE Quads 4/4, DF 1+/4   RLE 1+/4 throughout    Cognition: fund of knowledge and train of thought appropriate  SKIN: no rashes or lesions noted.  " Patient has block catheter   EXT: mild edema bilaterally, chronic stasis changes, no ulcers.       Thank you for the consult. Please call for any questions. Pager number [phone]     Daksha Willard       Total of 70 min spent in this encounter, more than 50% in counseling and coordination.

## 2019-09-20 NOTE — PLAN OF CARE
VSS. Lower back pain controlled with baclofen and repositioning. Neuros unchanged;O x 4, soft spoken. Slight right facial droop. Dysconjugate right eye at times. LUE 3/5, all others 1/5. N/T to BUE and BLE. Good po intake, needs help with tray set up. Napoles with good output. Had BM today. Worked with both PT and OT. Sat in recliner x 1, nursing uses lift. Repositioned multiple times in bed with cares. Unsure of plan at this time. Pt is able to make needs known.

## 2019-09-20 NOTE — PROGRESS NOTES
S: Complaining of back pain and spasticity.    O:  Exam:  General: Awake;  Alert, In No Acute Distress  Pulm: Breathing Comfortably on room air  Mental status: Oriented x 3  Cranial Nerves: Cranial Nerves II-XII Intact Bilaterally, except Right sided facial weakness, able to blink with right eye but cannot raise right eyebrow or right lip. Hearing decreased in right ear due to history of trauma to the ear  Strength:   Modified Lakhwinder Scale: 4 in LLE, 3 in RLE, 3 in RUE, 1 in LUE  He is 2-3/5 in RUE which is his baseline, 5/5 in LUE, 0-1/5 in RLE, 2-3/5 in LLE  Sensory: Intact to Light Touch  Reflexes: 2+ in left brachial and left biceps, 0 in left brachial, left biceps, right and left patellar      Assessment:   53 year old male who presents with increased rigidity and spasticity in his extremities, more marked in his lower extremities, with no other signs to suggest baclofen withdrawal.    Plan:     Will interrogate baclofen pump, will involve [employer] rep for more in depth diagnostics  Further assessment of the pump will depend on what is found when interrogating his pump  Will likely get a contrast study pending pump interrogation  Follow up PM&R consult for Baclofen pump management as well  Hold anticoagulation  Further plan pending pump assessment      Jefe Medina M.D.  Neurosurgery Resident, PGY-2    Please contact neurosurgery resident on call with questions.    Dial * * [phone], enter [phone] when prompted.     I have reviewed the history above and agree with the resident's assessment and plan.  KELTON Jeffrey MD

## 2019-09-20 NOTE — PROGRESS NOTES
09/20/19 1500   Quick Adds   Type of Visit Initial Occupational Therapy Evaluation   Living Environment   Lives With friend(s)   Living Arrangements house   Home Accessibility wheelchair accessible;stairs to enter home  (1 step and a ramp)   Number of Stairs, Main Entrance 1   Stair Railings, Main Entrance railings on both sides of stairs   Transportation Anticipated family or friend will provide   Living Environment Comment Pt lives in a house with his friends. Pt house is wc accessible. Pt has a walk in shower with a tub bench and grab bars    Self-Care   Usual Activity Tolerance good   Current Activity Tolerance poor   Regular Exercise No   Equipment Currently Used at Home walker, elsa;wheelchair, manual;wheelchair, power;tub bench;grab bar, tub/shower;grab bar, toilet   Activity/Exercise/Self-Care Comment Pt was previously mod I- mod A for ADL completion   Functional Level   Ambulation 1-->assistive equipment   Transferring 1-->assistive equipment   Toileting 1-->assistive equipment   Bathing 3-->assistive equipment and person   Dressing 2-->assistive person   Eating 0-->independent   Communication 0-->understands/communicates without difficulty   Cognition 0 - no cognition issues reported   Fall history within last six months no   Which of the above functional risks had a recent onset or change? ambulation;transferring;toileting;bathing   Prior Functional Level Comment Pt has a PCA that assists for 4 hours every morning for showering, dressing and bed mobility       Present no   Language english   General Information   Onset of Illness/Injury or Date of Surgery - Date 09/19/19   Referring Physician Coby Mcwilliams APRN CNP   Patient/Family Goals Statement To return home   Additional Occupational Profile Info/Pertinent History of Current Problem 53 year old male who presents with increased rigidity and spasticity in his extremities, more marked in his lower extremities, with no  other signs to suggest baclofen withdrawal.   Precautions/Limitations no known precautions/limitations   Weight-Bearing Status - LUE full weight-bearing   Weight-Bearing Status - RUE full weight-bearing   Weight-Bearing Status - LLE full weight-bearing   Weight-Bearing Status - RLE full weight-bearing   General Observations Pt is pleasant and agreeable to therapy   General Info Comments Activity: up with assist   Cognitive Status Examination   Orientation orientation to person, place and time   Level of Consciousness alert   Follows Commands (Cognition) WNL   Memory intact   Attention No deficits were identified   Organization/Problem Solving No deficits were identified   Executive Function No deficits were identified   Cognitive Comment Pt is alert, oriented and generally appropriate in conversation   Visual Perception   Visual Perception No deficits were identified   Sensory Examination   Sensory Quick Adds No deficits were identified   Integumentary/Edema   Integumentary/Edema no deficits were identifed   Range of Motion (ROM)   ROM Comment LUE WFL, RUE significantly limited due to spasticity   Strength   Strength Comments LUE 5/5, R shoulder 2/5, elbow 2/5, wrist 1/5   Hand Strength   Hand Strength Comments L  strength WNL   Mobility   Bed Mobility Bed mobility skill: Supine to sit;Bed mobility skill: Sit to supine   Bed Mobility Skill: Sit to Supine   Level of Atkinson: Sit/Supine moderate assist (50% patients effort)   Physical Assist/Nonphysical Assist: Sit/Supine 1 person assist   Bed Mobility Skill: Supine to Sit   Level of Atkinson: Supine/Sit moderate assist (50% patients effort)   Physical Assist/Nonphysical Assist: Supine/Sit 1 person assist   Lower Body Dressing   Level of Atkinson: Dress Lower Body dependent (less than 25% patients effort)   Physical Assist/Nonphysical Assist: Dress Lower Body 1 person assist   Instrumental Activities of Daily Living (IADL)   IADL Comments Pt was  "previously receiving assistance with IADL completion.    Activities of Daily Living Analysis   Impairments Contributing to Impaired Activities of Daily Living pain;ROM decreased;strength decreased   General Therapy Interventions   Planned Therapy Interventions ADL retraining;IADL retraining;ROM;strengthening;stretching;progressive activity/exercise   Clinical Impression   Criteria for Skilled Therapeutic Interventions Met yes, treatment indicated   OT Diagnosis decreased activity tolerance and independence with ADLs   Influenced by the following impairments weakness, fatigue, pain, decreased ROM   Assessment of Occupational Performance 5 or more Performance Deficits   Identified Performance Deficits g/h, toileting, bathing, dressing, functional mobility   Clinical Decision Making (Complexity) Low complexity   Therapy Frequency 6x/week   Predicted Duration of Therapy Intervention (days/wks) 9/25/19   Anticipated Equipment Needs at Discharge other (see comments)  (TBD)   Anticipated Discharge Disposition Acute Rehabilitation Facility   Risks and Benefits of Treatment have been explained. Yes   Patient, Family & other staff in agreement with plan of care Yes   [facility] TM \"6 Clicks\"   2016, Trustees of [facility], under license to [employer].  All rights reserved.   6 Clicks Short Forms Daily Activity Inpatient Short Form   [facility]  \"6 Clicks\" Daily Activity Inpatient Short Form   1. Putting on and taking off regular lower body clothing? 1 - Total   2. Bathing (including washing, rinsing, drying)? 2 - A Lot   3. Toileting, which includes using toilet, bedpan or urinal? 2 - A Lot   4. Putting on and taking off regular upper body clothing? 2 - A Lot   5. Taking care of personal grooming such as brushing teeth? 3 - A Little   6. Eating meals? 4 - None   Daily Activity Raw Score (Score out of 24.Lower scores equate to lower levels of function) 14   Total Evaluation Time   Total " Evaluation Time (Minutes) 5

## 2019-09-20 NOTE — PLAN OF CARE
OT 6A  Discharge Planner OT   Patient plan for discharge: rehab  Current status: Eval complete, treatment indicated. Mod A supine<>EOB with HOB elevated. Pt sat EOB with SBA. Pt completed AROM/AAROM to LUE and AAROM/PROM to RUE. Pt completed weight shifting and dynamic reaching while seated EOB to work on core strengthening   Barriers to return to prior living situation: fatigue, weakness, spasticity, muscle tone, acute medical needs  Recommendations for discharge: ARU  Rationale for recommendations: Pt is below baseline and would benefit from continued skilled therapy to increase activity tolerance and independence with ADLs. Pt is motivated, has a good support system and is making good progress with therapy. Pt is able to tolerate 3 hours of therapy per day.        Entered by: Lizbet Ferrera 09/20/2019 3:55 PM

## 2019-09-20 NOTE — PLAN OF CARE
"Status: Admitted and monitored for baclofen pump malfunction  Vitals: VSS  Neuros: Ox 4, muscle spasms increased since last 10-14 days per pt. LUE 3/5, 1/5 remaining extremities per baseline from previous stroke, slight dysconjugate R pupil and Slight right facial droop from CVA as well. Pt has baseline soft voice, more depressed of late since baclofen pump surgery  IV: PIV saline locked, requests cap put on end for infection prevention  Resp/trach: WNL  Diet: tolerated regular diet with tray set-up, good intake  Bowel status: No BM since yesterday per pt, requests enema in AM, med in bin  : block in place for neurogenic bladder, good UOP since pt began eating this afternoon  Skin: abd and spine incision sutured, covered with gauze and foam tape per pt request at approx 2230. BLE rooke boots in place  Pain: managed effectively with prn regimen of dilaudid and baclofen  Activity: refuses repositioning despite education. Writer convinced pt to lay on L side for 15 min, pt said he \"couldn't tolerate laying on side\". Mepilex placed at 2230 with education about repositioning and risks for pressure injury  Social: n/a  Plan: awaiting NSG to decide plan for baclofen pump    "

## 2019-09-20 NOTE — PLAN OF CARE
Status: Admitted and monitored for baclofen pump malfunction  Vitals: VSS on RA, intermittently bradycardic (50-59), MD notified.    Neuros: AOX4, slight right facial droop, slight left tongue deviation, RUE 1-2/5, LUE 3/5, BLE 1/5, BUE/BLE numbness & tingling, dysconjugate R eye intermittently   IV: PIV SL'd  Resp/trach: WNL    Diet: Regular   Bowel status: no BM this shift, requests enema in the AM  : Napoles in place for neurogenic bladder, adequate UO  Skin: ABD incision, back incision, covered with dressing, CDI.   Pain: Pain managed with PRN baclofen and dilaudid    Activity: pt. refuses to be repositioned, up w/ A2 & mech. Lift   Social: no overnight visitors   Plan: awaiting neurosurg decision on baclofen pump, continue to monitor and follow POC

## 2019-09-21 LAB
ANION GAP SERPL CALCULATED.3IONS-SCNC: 7 MMOL/L (ref 3–14)
BACTERIA SPEC CULT: ABNORMAL
BASOPHILS # BLD AUTO: 0.2 10E9/L (ref 0–0.2)
BASOPHILS NFR BLD AUTO: 1.8 %
BUN SERPL-MCNC: 13 MG/DL (ref 7–30)
CALCIUM SERPL-MCNC: 9.1 MG/DL (ref 8.5–10.1)
CHLORIDE SERPL-SCNC: 109 MMOL/L (ref 94–109)
CO2 SERPL-SCNC: 25 MMOL/L (ref 20–32)
CREAT SERPL-MCNC: 0.73 MG/DL (ref 0.66–1.25)
DIFFERENTIAL METHOD BLD: NORMAL
EOSINOPHIL # BLD AUTO: 0.7 10E9/L (ref 0–0.7)
EOSINOPHIL NFR BLD AUTO: 7.9 %
ERYTHROCYTE [DISTWIDTH] IN BLOOD BY AUTOMATED COUNT: 13.7 % (ref 10–15)
GFR SERPL CREATININE-BSD FRML MDRD: >90 ML/MIN/{1.73_M2}
GLUCOSE SERPL-MCNC: 99 MG/DL (ref 70–99)
HCT VFR BLD AUTO: 42.3 % (ref 40–53)
HGB BLD-MCNC: 13.5 G/DL (ref 13.3–17.7)
IMM GRANULOCYTES # BLD: 0 10E9/L (ref 0–0.4)
IMM GRANULOCYTES NFR BLD: 0.4 %
LYMPHOCYTES # BLD AUTO: 3.3 10E9/L (ref 0.8–5.3)
LYMPHOCYTES NFR BLD AUTO: 38.7 %
Lab: ABNORMAL
MAGNESIUM SERPL-MCNC: 2.3 MG/DL (ref 1.6–2.3)
MCH RBC QN AUTO: 29.5 PG (ref 26.5–33)
MCHC RBC AUTO-ENTMCNC: 31.9 G/DL (ref 31.5–36.5)
MCV RBC AUTO: 93 FL (ref 78–100)
MONOCYTES # BLD AUTO: 0.7 10E9/L (ref 0–1.3)
MONOCYTES NFR BLD AUTO: 8.4 %
NEUTROPHILS # BLD AUTO: 3.6 10E9/L (ref 1.6–8.3)
NEUTROPHILS NFR BLD AUTO: 42.8 %
NRBC # BLD AUTO: 0 10*3/UL
NRBC BLD AUTO-RTO: 0 /100
PHOSPHATE SERPL-MCNC: 3.5 MG/DL (ref 2.5–4.5)
PLATELET # BLD AUTO: 343 10E9/L (ref 150–450)
POTASSIUM SERPL-SCNC: 3.7 MMOL/L (ref 3.4–5.3)
RBC # BLD AUTO: 4.57 10E12/L (ref 4.4–5.9)
SODIUM SERPL-SCNC: 141 MMOL/L (ref 133–144)
SPECIMEN SOURCE: ABNORMAL
WBC # BLD AUTO: 8.5 10E9/L (ref 4–11)

## 2019-09-21 PROCEDURE — 25000132 ZZH RX MED GY IP 250 OP 250 PS 637: Performed by: STUDENT IN AN ORGANIZED HEALTH CARE EDUCATION/TRAINING PROGRAM

## 2019-09-21 PROCEDURE — 25000125 ZZHC RX 250: Performed by: NURSE PRACTITIONER

## 2019-09-21 PROCEDURE — 12000001 ZZH R&B MED SURG/OB UMMC

## 2019-09-21 PROCEDURE — 25000132 ZZH RX MED GY IP 250 OP 250 PS 637: Performed by: NURSE PRACTITIONER

## 2019-09-21 PROCEDURE — 25000132 ZZH RX MED GY IP 250 OP 250 PS 637: Performed by: NEUROLOGICAL SURGERY

## 2019-09-21 PROCEDURE — 40000275 ZZH STATISTIC RCP TIME EA 10 MIN

## 2019-09-21 PROCEDURE — 85025 COMPLETE CBC W/AUTO DIFF WBC: CPT | Performed by: STUDENT IN AN ORGANIZED HEALTH CARE EDUCATION/TRAINING PROGRAM

## 2019-09-21 PROCEDURE — 94640 AIRWAY INHALATION TREATMENT: CPT

## 2019-09-21 PROCEDURE — 84100 ASSAY OF PHOSPHORUS: CPT | Performed by: STUDENT IN AN ORGANIZED HEALTH CARE EDUCATION/TRAINING PROGRAM

## 2019-09-21 PROCEDURE — 36415 COLL VENOUS BLD VENIPUNCTURE: CPT | Performed by: STUDENT IN AN ORGANIZED HEALTH CARE EDUCATION/TRAINING PROGRAM

## 2019-09-21 PROCEDURE — 83735 ASSAY OF MAGNESIUM: CPT | Performed by: STUDENT IN AN ORGANIZED HEALTH CARE EDUCATION/TRAINING PROGRAM

## 2019-09-21 PROCEDURE — 80048 BASIC METABOLIC PNL TOTAL CA: CPT | Performed by: STUDENT IN AN ORGANIZED HEALTH CARE EDUCATION/TRAINING PROGRAM

## 2019-09-21 RX ADMIN — PRAMOXINE HYDROCHLORIDE HYDROCORTISONE ACETATE: 100; 100 AEROSOL, FOAM TOPICAL at 21:26

## 2019-09-21 RX ADMIN — TOPIRAMATE 25 MG: 25 TABLET, FILM COATED ORAL at 07:56

## 2019-09-21 RX ADMIN — DIAZEPAM 10 MG: 5 TABLET ORAL at 21:26

## 2019-09-21 RX ADMIN — DULOXETINE HYDROCHLORIDE 60 MG: 60 CAPSULE, DELAYED RELEASE ORAL at 07:56

## 2019-09-21 RX ADMIN — SENNOSIDES AND DOCUSATE SODIUM 1 TABLET: 8.6; 5 TABLET ORAL at 20:17

## 2019-09-21 RX ADMIN — DOCUSATE SODIUM 1 ENEMA: 283 LIQUID RECTAL at 09:52

## 2019-09-21 RX ADMIN — MULTIPLE VITAMINS W/ MINERALS TAB 1 TABLET: TAB at 07:56

## 2019-09-21 RX ADMIN — LACTULOSE 30 ML: 20 SOLUTION ORAL at 10:59

## 2019-09-21 RX ADMIN — HYDROMORPHONE HYDROCHLORIDE 6 MG: 4 TABLET ORAL at 10:59

## 2019-09-21 RX ADMIN — ALBUTEROL SULFATE 2.5 MG: 2.5 SOLUTION RESPIRATORY (INHALATION) at 12:51

## 2019-09-21 RX ADMIN — DOXEPIN HYDROCHLORIDE 150 MG: 50 CAPSULE ORAL at 21:26

## 2019-09-21 RX ADMIN — MELATONIN 2000 UNITS: at 07:56

## 2019-09-21 RX ADMIN — GUAIFENESIN 600 MG: 600 TABLET ORAL at 07:56

## 2019-09-21 RX ADMIN — DOCUSATE SODIUM 1 ENEMA: 283 LIQUID RECTAL at 18:26

## 2019-09-21 RX ADMIN — BACLOFEN 20 MG: 10 TABLET ORAL at 13:46

## 2019-09-21 RX ADMIN — GUAIFENESIN 600 MG: 600 TABLET ORAL at 20:17

## 2019-09-21 RX ADMIN — PANTOPRAZOLE SODIUM 40 MG: 40 TABLET, DELAYED RELEASE ORAL at 07:56

## 2019-09-21 RX ADMIN — TOPIRAMATE 25 MG: 25 TABLET, FILM COATED ORAL at 20:17

## 2019-09-21 RX ADMIN — HYDROMORPHONE HYDROCHLORIDE 6 MG: 4 TABLET ORAL at 20:17

## 2019-09-21 RX ADMIN — NITROFURANTOIN (MONOHYDRATE/MACROCRYSTALS) 100 MG: 75; 25 CAPSULE ORAL at 20:17

## 2019-09-21 RX ADMIN — NITROFURANTOIN (MONOHYDRATE/MACROCRYSTALS) 100 MG: 75; 25 CAPSULE ORAL at 07:56

## 2019-09-21 ASSESSMENT — ACTIVITIES OF DAILY LIVING (ADL)
ADLS_ACUITY_SCORE: 25
ADLS_ACUITY_SCORE: 26

## 2019-09-21 NOTE — PLAN OF CARE
"PT/6a: cx - pt declines PT today d/t \"having issues w/ his bowels\". Pt reports not feeling well w/ lots of abdominal cramping.   "

## 2019-09-21 NOTE — PROGRESS NOTES
Baclofen pump change    reprimed catheter after scan   Increased rate of baclofen pump to 137.5 mcg/day from 125 mcg/day for 10% increase

## 2019-09-21 NOTE — PLAN OF CARE
Status: Pt incomplete quadraplegic, admitted w/ baclofen pump difficulty.  Vitals: VSS  Neuros: A/O x4. R droop, R eye dysconjugate at times, R eye Augustine. Slight L tongue deviation. L side 3/5. RUE 1/5, RLE 2/5  IV: PIV SL  Resp/trach: RA O2 sats stable, lungs clear  Diet: Reg diet  Bowel status: BS active, No BM overnight  : Napoles in place (neurogenic bladder)  Skin: Abd/back incision WNL  Pain: Low back pain, managed w/scheduled pain medications. No pain med requests overnight  Activity: Up w/2 and lift  Plan: Continue to monitor and follow POC

## 2019-09-21 NOTE — PLAN OF CARE
Neuros at baseline.  Baclofen pump dye study done this shift.  Good urine volumes via block.  BM after dulcolax supp.  Regular diet,  feeds self with set-up.  Plan ARU.

## 2019-09-21 NOTE — PLAN OF CARE
Status: Pt here with increased spacticity and concern for baclofen pump not working correctly. Pt also has an UTI.   Vitals: AVSS on RA  Neuros: A&Ox4- intact except numbness and tingling to right side of face, left out thigh and lower back/ buttock, LUE/LLE 3-4/5 and RUE 1/5, RLE 2/5.   IV: PIV SL  Resp/trach: LS CTA on RA. Neb Tx x1 and effective  Diet: Regular diet with fair PO; good PO of fluids  Bowel status: BS+x4; No BM yet despite enema and PRN lactulose- declines miralax nor further interventions  : On ABx PO for UTI; chronically has block in for neurogenic bladder  Skin: Intact- refused q 2 hour repositioning  despite education- pt states he shifts his weight frequently in bed.  Pain: Lower back pain/ spasms moderately controlled with PRN Dilaudid x1 and Baclofen x1. Pt's baclofen pump rate increased this morning.  Activity: Up with lift  Social: No visitors this shift  Plan: Will continue to monitor and follow with POC.

## 2019-09-22 ENCOUNTER — APPOINTMENT (OUTPATIENT)
Dept: OCCUPATIONAL THERAPY | Facility: CLINIC | Age: 54
DRG: 091 | End: 2019-09-22
Attending: NEUROLOGICAL SURGERY
Payer: COMMERCIAL

## 2019-09-22 ENCOUNTER — APPOINTMENT (OUTPATIENT)
Dept: PHYSICAL THERAPY | Facility: CLINIC | Age: 54
DRG: 091 | End: 2019-09-22
Attending: NEUROLOGICAL SURGERY
Payer: COMMERCIAL

## 2019-09-22 LAB
ANION GAP SERPL CALCULATED.3IONS-SCNC: 9 MMOL/L (ref 3–14)
BASOPHILS # BLD AUTO: 0.2 10E9/L (ref 0–0.2)
BASOPHILS NFR BLD AUTO: 1.8 %
BUN SERPL-MCNC: 12 MG/DL (ref 7–30)
CALCIUM SERPL-MCNC: 8.8 MG/DL (ref 8.5–10.1)
CHLORIDE SERPL-SCNC: 108 MMOL/L (ref 94–109)
CO2 SERPL-SCNC: 24 MMOL/L (ref 20–32)
CREAT SERPL-MCNC: 0.79 MG/DL (ref 0.66–1.25)
DIFFERENTIAL METHOD BLD: ABNORMAL
EOSINOPHIL # BLD AUTO: 0.8 10E9/L (ref 0–0.7)
EOSINOPHIL NFR BLD AUTO: 7.6 %
ERYTHROCYTE [DISTWIDTH] IN BLOOD BY AUTOMATED COUNT: 13.7 % (ref 10–15)
GFR SERPL CREATININE-BSD FRML MDRD: >90 ML/MIN/{1.73_M2}
GLUCOSE SERPL-MCNC: 88 MG/DL (ref 70–99)
HCT VFR BLD AUTO: 41.8 % (ref 40–53)
HGB BLD-MCNC: 13.1 G/DL (ref 13.3–17.7)
IMM GRANULOCYTES # BLD: 0 10E9/L (ref 0–0.4)
IMM GRANULOCYTES NFR BLD: 0.3 %
LYMPHOCYTES # BLD AUTO: 4.8 10E9/L (ref 0.8–5.3)
LYMPHOCYTES NFR BLD AUTO: 47.9 %
MAGNESIUM SERPL-MCNC: 2.2 MG/DL (ref 1.6–2.3)
MCH RBC QN AUTO: 29.5 PG (ref 26.5–33)
MCHC RBC AUTO-ENTMCNC: 31.3 G/DL (ref 31.5–36.5)
MCV RBC AUTO: 94 FL (ref 78–100)
MONOCYTES # BLD AUTO: 0.9 10E9/L (ref 0–1.3)
MONOCYTES NFR BLD AUTO: 8.5 %
NEUTROPHILS # BLD AUTO: 3.4 10E9/L (ref 1.6–8.3)
NEUTROPHILS NFR BLD AUTO: 33.9 %
NRBC # BLD AUTO: 0 10*3/UL
NRBC BLD AUTO-RTO: 0 /100
PHOSPHATE SERPL-MCNC: 3.8 MG/DL (ref 2.5–4.5)
PLATELET # BLD AUTO: 341 10E9/L (ref 150–450)
POTASSIUM SERPL-SCNC: 3.6 MMOL/L (ref 3.4–5.3)
RBC # BLD AUTO: 4.44 10E12/L (ref 4.4–5.9)
SODIUM SERPL-SCNC: 140 MMOL/L (ref 133–144)
WBC # BLD AUTO: 10 10E9/L (ref 4–11)

## 2019-09-22 PROCEDURE — 97530 THERAPEUTIC ACTIVITIES: CPT | Mod: GP

## 2019-09-22 PROCEDURE — 12000001 ZZH R&B MED SURG/OB UMMC

## 2019-09-22 PROCEDURE — 97110 THERAPEUTIC EXERCISES: CPT | Mod: GP

## 2019-09-22 PROCEDURE — 80048 BASIC METABOLIC PNL TOTAL CA: CPT | Performed by: STUDENT IN AN ORGANIZED HEALTH CARE EDUCATION/TRAINING PROGRAM

## 2019-09-22 PROCEDURE — 97110 THERAPEUTIC EXERCISES: CPT | Mod: GO | Performed by: OCCUPATIONAL THERAPIST

## 2019-09-22 PROCEDURE — 84100 ASSAY OF PHOSPHORUS: CPT | Performed by: STUDENT IN AN ORGANIZED HEALTH CARE EDUCATION/TRAINING PROGRAM

## 2019-09-22 PROCEDURE — 83735 ASSAY OF MAGNESIUM: CPT | Performed by: STUDENT IN AN ORGANIZED HEALTH CARE EDUCATION/TRAINING PROGRAM

## 2019-09-22 PROCEDURE — 25000132 ZZH RX MED GY IP 250 OP 250 PS 637: Performed by: STUDENT IN AN ORGANIZED HEALTH CARE EDUCATION/TRAINING PROGRAM

## 2019-09-22 PROCEDURE — 25000132 ZZH RX MED GY IP 250 OP 250 PS 637: Performed by: NURSE PRACTITIONER

## 2019-09-22 PROCEDURE — 36415 COLL VENOUS BLD VENIPUNCTURE: CPT | Performed by: STUDENT IN AN ORGANIZED HEALTH CARE EDUCATION/TRAINING PROGRAM

## 2019-09-22 PROCEDURE — 97530 THERAPEUTIC ACTIVITIES: CPT | Mod: GO | Performed by: OCCUPATIONAL THERAPIST

## 2019-09-22 PROCEDURE — 25000132 ZZH RX MED GY IP 250 OP 250 PS 637: Performed by: NEUROLOGICAL SURGERY

## 2019-09-22 PROCEDURE — 85025 COMPLETE CBC W/AUTO DIFF WBC: CPT | Performed by: STUDENT IN AN ORGANIZED HEALTH CARE EDUCATION/TRAINING PROGRAM

## 2019-09-22 RX ADMIN — MULTIPLE VITAMINS W/ MINERALS TAB 1 TABLET: TAB at 10:00

## 2019-09-22 RX ADMIN — TOPIRAMATE 25 MG: 25 TABLET, FILM COATED ORAL at 10:01

## 2019-09-22 RX ADMIN — MELATONIN 2000 UNITS: at 10:00

## 2019-09-22 RX ADMIN — NITROFURANTOIN (MONOHYDRATE/MACROCRYSTALS) 100 MG: 75; 25 CAPSULE ORAL at 10:00

## 2019-09-22 RX ADMIN — GUAIFENESIN 600 MG: 600 TABLET ORAL at 10:01

## 2019-09-22 RX ADMIN — DOCUSATE SODIUM 1 ENEMA: 283 LIQUID RECTAL at 12:33

## 2019-09-22 RX ADMIN — DULOXETINE HYDROCHLORIDE 60 MG: 60 CAPSULE, DELAYED RELEASE ORAL at 10:00

## 2019-09-22 RX ADMIN — GUAIFENESIN 600 MG: 600 TABLET ORAL at 20:44

## 2019-09-22 RX ADMIN — DOXEPIN HYDROCHLORIDE 150 MG: 50 CAPSULE ORAL at 23:04

## 2019-09-22 RX ADMIN — NITROFURANTOIN (MONOHYDRATE/MACROCRYSTALS) 100 MG: 75; 25 CAPSULE ORAL at 20:44

## 2019-09-22 RX ADMIN — SENNOSIDES AND DOCUSATE SODIUM 1 TABLET: 8.6; 5 TABLET ORAL at 20:44

## 2019-09-22 RX ADMIN — DIAZEPAM 10 MG: 5 TABLET ORAL at 23:05

## 2019-09-22 RX ADMIN — PANTOPRAZOLE SODIUM 40 MG: 40 TABLET, DELAYED RELEASE ORAL at 10:01

## 2019-09-22 RX ADMIN — TOPIRAMATE 25 MG: 25 TABLET, FILM COATED ORAL at 20:44

## 2019-09-22 RX ADMIN — HYDROMORPHONE HYDROCHLORIDE 6 MG: 4 TABLET ORAL at 20:44

## 2019-09-22 ASSESSMENT — ACTIVITIES OF DAILY LIVING (ADL)
ADLS_ACUITY_SCORE: 26

## 2019-09-22 NOTE — PLAN OF CARE
Discharge Planner OT   6A   [facility]  Current status: Pt continues to be below his baseline due to increased tone in R UE and LLE.  Facilitated increased IND and tolerance for standing with SBA x2 and elsa walker, tolerated 10' in standing with no c/o back mm spasm.  Barriers to return to prior living situation: fatigue, weakness, spasticity, muscle tone, acute medical needs  Recommendations for discharge: ARU  Rationale for recommendations: Pt is below baseline and would benefit from continued skilled therapy to increase activity tolerance and independence with ADLs. Pt is motivated, has a good support system and is making good progress with therapy. Pt is able to tolerate 3 hours of therapy per day.        Entered by: Nicole Garcia 09/22/2019 4:48 PM

## 2019-09-22 NOTE — PLAN OF CARE
Status: Pt here with increased spacticity and concern for baclofen pump not working correctly. Pt also has an UTI.   Vitals: AVSS on RA  Neuros: A&Ox4- intact except numbness and tingling to right side of face, left out thigh and lower back/ buttock, LUE/LLE 3-4/5 and RUE 1/5, RLE 2/5.   IV: PIV SL  Resp/trach: LS CTA on RA. Neb Tx x1 and effective  Diet: Regular diet with fair PO; good PO of fluids  Bowel status: BS+x4; Had 1 medium formed BM after PRN enema.  : On ABx PO for UTI; chronically has block in for neurogenic bladder  Skin: Intact except for incision over baclofen pump and back incision from previous surgery- intact with sutures- refused q 2 hour repositioning  despite education- pt states he shifts his weight frequently in bed.  Pain: Lower back pain/ spasms moderately controlled with PRN Dilaudid x1. Pt's baclofen pump rate increased this morning.  Activity: Up with lift  Social: No visitors this shift  Plan: Will continue to monitor and follow with POC.

## 2019-09-22 NOTE — PLAN OF CARE
Discharge Planner PT   PT 6A  Patient plan for discharge: Return to [facility]  Current status: Co-treat session with OT. Pt completed supine LE exercises for ROM and to reduce extensor tone. Right AFO and shoes donned for standing activity. Pt transferred supine to sitting with assist of 2. Pt can move trunk to sitting but needs assist to move LEs off bed. Pt transferred sit to stand with SBA of 2 and performed trunk exercises standing to reduce back tone. Marching was attempted but pt was unable to lift the left LE. Pt stands with hemiwalker and SBA of 2. Pt transferred sit to supine with assist of 2.   Barriers to return to prior living situation: Medical condition, decreased ambulatory ability, assist with transfers  Recommendations for discharge: [facility]  Rationale for recommendations: Continued rehab recommended to progress with strengthening, LE tone reductions, transfers, and ambulation to prepare pt for return home.       Entered by: Wes Almaraz 09/22/2019 5:09 PM

## 2019-09-22 NOTE — PROGRESS NOTES
"Neurosurgery Progress Note    S: Complaining of ongoing rigidity of right arm and both legs.    O:  /65 (BP Location: Left arm)   Pulse 85   Temp 97.2  F (36.2  C) (Oral)   Resp 18   Ht 1.803 m (5' 11\")   Wt 94.8 kg (209 lb)   SpO2 96%   BMI 29.15 kg/m    Exam:  General: Awake;  Alert, In No Acute Distress  Pulm: Breathing Comfortably on room air  Mental status: Oriented x 3  Cranial Nerves: Cranial Nerves II-XII Intact Bilaterally, except Right sided facial weakness, able to blink with right eye but cannot raise right eyebrow or right lip. Hearing decreased in right ear due to history of trauma to the ear  Strength:   Modified Lakhwinder Scale: 4 in LLE, 3 in RLE, 3 in RUE, 1 in LUE  He is 2-3/5 in RUE which is his baseline, 5/5 in LUE, 5/5 in RLE, 2-3/5 in LLE  Sensory: Intact to Light Touch  Reflexes: 2+ in left brachial and left biceps, 0 in left brachial, left biceps, right and left patellar      Assessment:   53 year old male who presents with increased rigidity and spasticity in his extremities, more marked in his lower extremities, with no other signs to suggest baclofen withdrawal.    Plan:   Baclofen pump interrogation and dye study shows working pump  Follow up PM&R consult for Baclofen pump management as well  Pump rate increased to 150.4 mcg/day from 137.4  Hold anticoagulation    Dorothy Deutsch MD  Neurosurgery Resident, PGY-1  "

## 2019-09-22 NOTE — PLAN OF CARE
Status: Pt is incomplete quadriplegic, admitted w/ increased spasticity & concern of baclofen pump malfunction & UTI.  Vitals: VSS on RA. Pt refused 0400 assessments.   Neuros: A&Ox4. N/T to R face, R droop (previous stroke), L thigh numbness, lower back & R buttock numbness. LUE 4/5, LLE 3/5, RUE 2/5, RLE 1/5. Diminished hearing in R ear at baseline.   IV: PIV SL  Resp/trach: LS clear, diminished bases.   Diet: Regular diet. Adequate intake.  Bowel status: No BM overnight. BS+.   : Napoles in for neurogenic bladder. Adequate UO.   Skin: Incision over abd. For baclofen pump & back incision w/ sutures from previous surgery.   Pain: Pt denied pain, slept comfortably between cares.    Activity: Up with lift. Pt refused repositioning over shift. Pt was educated on importance of repositioning to maintain skin integrity. Pt stated  I've been in a chair for years and have never come close to having skin breakdown.    Social: No visitors this shift  Plan: Will continue to monitor and follow with POC.

## 2019-09-22 NOTE — PROGRESS NOTES
S: Complaining of ongoing spasticity/rigidity of right arm and leg.    O:  Exam:  General: Awake;  Alert, In No Acute Distress  Pulm: Breathing Comfortably on room air  Mental status: Oriented x 3  Cranial Nerves: Cranial Nerves II-XII Intact Bilaterally, except Right sided facial weakness, able to blink with right eye but cannot raise right eyebrow or right lip. Hearing decreased in right ear due to history of trauma to the ear  Strength:   Modified Lakhwinder Scale: 4 in LLE, 3 in RLE, 3 in RUE, 1 in LUE  He is 2-3/5 in RUE which is his baseline, 5/5 in LUE, 0-1/5 in RLE, 2-3/5 in LLE  Sensory: Intact to Light Touch  Reflexes: 2+ in left brachial and left biceps, 0 in left brachial, left biceps, right and left patellar      Assessment:   53 year old male who presents with increased rigidity and spasticity in his extremities, more marked in his lower extremities, with no other signs to suggest baclofen withdrawal.    Plan:     baclofen pump interrogation and dye study shows working pump  Follow up PM&R consult for Baclofen pump management as well  Hold anticoagulation      Jefe Medina M.D.  Neurosurgery Resident, PGY-2    Please contact neurosurgery resident on call with questions.    Dial * [phone], enter [phone] when prompted.

## 2019-09-23 ENCOUNTER — APPOINTMENT (OUTPATIENT)
Dept: OCCUPATIONAL THERAPY | Facility: CLINIC | Age: 54
DRG: 091 | End: 2019-09-23
Attending: NEUROLOGICAL SURGERY
Payer: COMMERCIAL

## 2019-09-23 ENCOUNTER — APPOINTMENT (OUTPATIENT)
Dept: PHYSICAL THERAPY | Facility: CLINIC | Age: 54
DRG: 091 | End: 2019-09-23
Attending: NEUROLOGICAL SURGERY
Payer: COMMERCIAL

## 2019-09-23 LAB
ANION GAP SERPL CALCULATED.3IONS-SCNC: 7 MMOL/L (ref 3–14)
BASOPHILS # BLD AUTO: 0.2 10E9/L (ref 0–0.2)
BASOPHILS NFR BLD AUTO: 1.5 %
BUN SERPL-MCNC: 11 MG/DL (ref 7–30)
CALCIUM SERPL-MCNC: 8.8 MG/DL (ref 8.5–10.1)
CHLORIDE SERPL-SCNC: 108 MMOL/L (ref 94–109)
CO2 SERPL-SCNC: 24 MMOL/L (ref 20–32)
CREAT SERPL-MCNC: 0.7 MG/DL (ref 0.66–1.25)
DIFFERENTIAL METHOD BLD: ABNORMAL
EOSINOPHIL # BLD AUTO: 0.7 10E9/L (ref 0–0.7)
EOSINOPHIL NFR BLD AUTO: 6.1 %
ERYTHROCYTE [DISTWIDTH] IN BLOOD BY AUTOMATED COUNT: 13.6 % (ref 10–15)
GFR SERPL CREATININE-BSD FRML MDRD: >90 ML/MIN/{1.73_M2}
GLUCOSE SERPL-MCNC: 94 MG/DL (ref 70–99)
HCT VFR BLD AUTO: 42.3 % (ref 40–53)
HGB BLD-MCNC: 13.7 G/DL (ref 13.3–17.7)
IMM GRANULOCYTES # BLD: 0 10E9/L (ref 0–0.4)
IMM GRANULOCYTES NFR BLD: 0.2 %
LYMPHOCYTES # BLD AUTO: 5.1 10E9/L (ref 0.8–5.3)
LYMPHOCYTES NFR BLD AUTO: 45 %
MAGNESIUM SERPL-MCNC: 2.2 MG/DL (ref 1.6–2.3)
MCH RBC QN AUTO: 30.1 PG (ref 26.5–33)
MCHC RBC AUTO-ENTMCNC: 32.4 G/DL (ref 31.5–36.5)
MCV RBC AUTO: 93 FL (ref 78–100)
MONOCYTES # BLD AUTO: 0.8 10E9/L (ref 0–1.3)
MONOCYTES NFR BLD AUTO: 7 %
NEUTROPHILS # BLD AUTO: 4.6 10E9/L (ref 1.6–8.3)
NEUTROPHILS NFR BLD AUTO: 40.2 %
NRBC # BLD AUTO: 0 10*3/UL
NRBC BLD AUTO-RTO: 0 /100
PHOSPHATE SERPL-MCNC: 3.2 MG/DL (ref 2.5–4.5)
PLATELET # BLD AUTO: 354 10E9/L (ref 150–450)
PLATELET # BLD EST: ABNORMAL 10*3/UL
POTASSIUM SERPL-SCNC: 3.6 MMOL/L (ref 3.4–5.3)
RBC # BLD AUTO: 4.55 10E12/L (ref 4.4–5.9)
SODIUM SERPL-SCNC: 138 MMOL/L (ref 133–144)
WBC # BLD AUTO: 11.3 10E9/L (ref 4–11)

## 2019-09-23 PROCEDURE — 85025 COMPLETE CBC W/AUTO DIFF WBC: CPT | Performed by: STUDENT IN AN ORGANIZED HEALTH CARE EDUCATION/TRAINING PROGRAM

## 2019-09-23 PROCEDURE — 36415 COLL VENOUS BLD VENIPUNCTURE: CPT | Performed by: STUDENT IN AN ORGANIZED HEALTH CARE EDUCATION/TRAINING PROGRAM

## 2019-09-23 PROCEDURE — 97110 THERAPEUTIC EXERCISES: CPT | Mod: GO

## 2019-09-23 PROCEDURE — 80048 BASIC METABOLIC PNL TOTAL CA: CPT | Performed by: STUDENT IN AN ORGANIZED HEALTH CARE EDUCATION/TRAINING PROGRAM

## 2019-09-23 PROCEDURE — 25000132 ZZH RX MED GY IP 250 OP 250 PS 637: Performed by: NEUROLOGICAL SURGERY

## 2019-09-23 PROCEDURE — 83735 ASSAY OF MAGNESIUM: CPT | Performed by: STUDENT IN AN ORGANIZED HEALTH CARE EDUCATION/TRAINING PROGRAM

## 2019-09-23 PROCEDURE — 25000132 ZZH RX MED GY IP 250 OP 250 PS 637: Performed by: STUDENT IN AN ORGANIZED HEALTH CARE EDUCATION/TRAINING PROGRAM

## 2019-09-23 PROCEDURE — 12000001 ZZH R&B MED SURG/OB UMMC

## 2019-09-23 PROCEDURE — 97530 THERAPEUTIC ACTIVITIES: CPT | Mod: GO

## 2019-09-23 PROCEDURE — 97530 THERAPEUTIC ACTIVITIES: CPT | Mod: GP

## 2019-09-23 PROCEDURE — 97110 THERAPEUTIC EXERCISES: CPT | Mod: GP

## 2019-09-23 PROCEDURE — 25000132 ZZH RX MED GY IP 250 OP 250 PS 637: Performed by: NURSE PRACTITIONER

## 2019-09-23 PROCEDURE — 84100 ASSAY OF PHOSPHORUS: CPT | Performed by: STUDENT IN AN ORGANIZED HEALTH CARE EDUCATION/TRAINING PROGRAM

## 2019-09-23 RX ADMIN — BACLOFEN 20 MG: 10 TABLET ORAL at 21:30

## 2019-09-23 RX ADMIN — NITROFURANTOIN (MONOHYDRATE/MACROCRYSTALS) 100 MG: 75; 25 CAPSULE ORAL at 20:34

## 2019-09-23 RX ADMIN — HYDROMORPHONE HYDROCHLORIDE 6 MG: 4 TABLET ORAL at 02:26

## 2019-09-23 RX ADMIN — DOCUSATE SODIUM 1 ENEMA: 283 LIQUID RECTAL at 12:25

## 2019-09-23 RX ADMIN — DULOXETINE HYDROCHLORIDE 60 MG: 60 CAPSULE, DELAYED RELEASE ORAL at 10:30

## 2019-09-23 RX ADMIN — HYDROMORPHONE HYDROCHLORIDE 6 MG: 4 TABLET ORAL at 17:29

## 2019-09-23 RX ADMIN — BACLOFEN 20 MG: 10 TABLET ORAL at 16:15

## 2019-09-23 RX ADMIN — PRAMOXINE HYDROCHLORIDE HYDROCORTISONE ACETATE: 100; 100 AEROSOL, FOAM TOPICAL at 12:25

## 2019-09-23 RX ADMIN — TOPIRAMATE 25 MG: 25 TABLET, FILM COATED ORAL at 20:33

## 2019-09-23 RX ADMIN — DOXEPIN HYDROCHLORIDE 150 MG: 50 CAPSULE ORAL at 21:30

## 2019-09-23 RX ADMIN — HYDROMORPHONE HYDROCHLORIDE 6 MG: 4 TABLET ORAL at 22:50

## 2019-09-23 RX ADMIN — NITROFURANTOIN (MONOHYDRATE/MACROCRYSTALS) 100 MG: 75; 25 CAPSULE ORAL at 10:29

## 2019-09-23 RX ADMIN — TOPIRAMATE 25 MG: 25 TABLET, FILM COATED ORAL at 10:30

## 2019-09-23 RX ADMIN — SENNOSIDES AND DOCUSATE SODIUM 1 TABLET: 8.6; 5 TABLET ORAL at 20:33

## 2019-09-23 RX ADMIN — MELATONIN 2000 UNITS: at 10:30

## 2019-09-23 RX ADMIN — ACETAMINOPHEN 650 MG: 325 TABLET, FILM COATED ORAL at 16:15

## 2019-09-23 RX ADMIN — GUAIFENESIN 600 MG: 600 TABLET ORAL at 20:33

## 2019-09-23 RX ADMIN — PANTOPRAZOLE SODIUM 40 MG: 40 TABLET, DELAYED RELEASE ORAL at 10:30

## 2019-09-23 RX ADMIN — GUAIFENESIN 600 MG: 600 TABLET ORAL at 10:29

## 2019-09-23 RX ADMIN — DIAZEPAM 10 MG: 5 TABLET ORAL at 21:30

## 2019-09-23 RX ADMIN — MULTIPLE VITAMINS W/ MINERALS TAB 1 TABLET: TAB at 10:30

## 2019-09-23 RX ADMIN — PRAMOXINE HYDROCHLORIDE HYDROCORTISONE ACETATE: 100; 100 AEROSOL, FOAM TOPICAL at 20:35

## 2019-09-23 ASSESSMENT — ACTIVITIES OF DAILY LIVING (ADL)
ADLS_ACUITY_SCORE: 28

## 2019-09-23 NOTE — PLAN OF CARE
Status: Pt incomplete quadraplegic, admitted w/ baclofen pump difficulty.  Vitals: VSS  Neuros: A/O x4. R droop, R eye dysconjugate at times, R ear [place]. Slight L tongue deviation. LUE 4/5. LLE 3/5 RUE 1/5, RLE 2/5  IV: PIV SL  Resp/trach: RA O2 sats stable, lungs clear  Diet: Reg diet  Bowel status: BS active, No BM overnight (Bowel maint. Program followed)  : Napoles in place (neurogenic bladder)  Skin: Abd/back incision WNL  Pain: Low back pain, managed w/scheduled pain medications. Dilaudid given x2 this shift.  Activity: Up w/2 and lift  Plan: Continue to monitor and follow POC

## 2019-09-23 NOTE — PLAN OF CARE
6A Discharge Planner PT   Patient plan for discharge: rehab  Current status: Dependent don of B shoes and R AFO. Progressed bed mob (CGA), STSs (CGA-min A), pre-gait (min A), and standing tolerance (CGA-min A x 1-2) with use of elsa-walker in L UE. Attempted B LE forward progression (min A at trunk with GB and max A x 1 at knees for B LE progression) - unable to clear B feet 2/2 weakness and increased tone/spasticity elicited with attempt to step. Increased back pain. Cues for controlled deep breathing. Recommend nursing utilize OH ceiling lift for all transfers.     Barriers to return to prior living situation: Medical status, current mob status (unable to ambulate), level of A, weakness, tone/spasticity, balance  [facility]  Rationale for recommendations: Pt would greatly benefit to return to ARU to improve  strength, LE tone reduction, transfers, and ambulation to prepare pt for return home.         Entered by: Cheli Wayne 09/23/2019 12:16 PM

## 2019-09-23 NOTE — PLAN OF CARE
OT 6A  Discharge Planner OT   Patient plan for discharge: ARU  Current status: Mod A supine to EOB with HOB elevated. Pt sat EOB with SBA. Pt completed bilateral weight shifting while seated EOB with SBA/occasional min A due to core weakness. Therapist provided PROM to CIELO and AAROM to NABILAE. Pt dependently transferred from EOB to recliner with A x2 and lift  Barriers to return to prior living situation: fatigue, weakness, acute medical needs  Re[facility]ations for [facility]  Rationale for recommendations: Pt is below baseline and would benefit from continued skilled therapy to increase activity tolerance and independence with ADLs. Pt is motivated, has a good support system and is making good progress with therapy. Pt is able to tolerate 3 hours of therapy per day.        Entered by: Lizbet Ferrera 09/23/2019 3:59 PM

## 2019-09-23 NOTE — PROGRESS NOTES
[facility], [place]   Physical Medicine and Rehabilitation Daily Note    Attending's note   Patient seen and examined along with the resident and medical student   I was present throughout the examination and ITBP interrogation   We increased the dose to 164.9 mcg/day. On examination., his tone is worse than encountered last Friday.   We will continue to follow along and make adjustments as needed based on the examination.      Thank you for consulting the PM&R Department.   For any questions, please feel free to page me at [phone]   Total of 65 min spent in this encounter more than 50% in counseling and coordination. Coordinated with Dr De La Cruz from neurosurgery, RN, PT     Daksha Willard MD  Department of PM&R             Assessment and Plan of Care:   Mr. Baxter is a 54 yo male with history of C1 incomplete tetraplegia, who presented with increased spasticity and declined function secondary to it. He continues to have ongoing increased tone affecting his right arm and both legs despite increased ITB dose.     On interrogation today, the pump shows a concentration of 1000 mcg/mL, a rate of 150.4 mcg/day, simple continuous infusion, and an alarm date of 10/10/19. Reservoir volume is 4.7 mL.    His rate was increased to 164.9 mcg/mL. After the change the concentration was still 1000 mcg/mL, the rate was 164.9 mcg/day, simple continuous infusion, with an alarm date of 10/9/19. Reservoir volume is 4.7 mL.    We will continue to follow this patient and increase his rate, with target rate of 225 mcg/day.         Interval history:   Patient last seen by PM&R Friday. Since then dye study performed with neurosurgery; it shows a working pump. His baclofen dose has been increased over the last two days; current infusion rate 150.4 mcg/day. He has not noticed significant change with this increase. Still feels very tight in bilateral legs. No signs of baclofen withdrawal or intoxication.  Currently voiding via Napoles. Last BM yesterday. Receiving dilaudid for pain. Says he slept poorly last night, which he attributes to worrying about not walking.          Physical Exam:     Vitals:    09/22/19 1519 09/22/19 2316 09/23/19 0300 09/23/19 0752   BP: 102/62 120/77 109/71 103/61   BP Location:   Left arm Left arm   Pulse: 77 72 68 53   Resp: 16 18 16 16   Temp: 98  F (36.7  C) 97.3  F (36.3  C) 97.2  F (36.2  C) 95.6  F (35.3  C)   TempSrc: Axillary Oral Oral Oral   SpO2: 97% 97% 97% 98%   Weight:       Height:         Gen: Mild discomfort, resting in bed  Ext: Compression boot on RLE, PRAFO on LLE  MSK/neuro: alert and oriented. speech fluent. Tone increased in both legs, R arm. Modified Lakhwinder Scale: 4 in LLE to dorsiflexion and knee flexion. 3 in RLE to dorsiflexion and knee flexion/extension. 3 in RUE to elbow extension. 1 in LUE. Multiple beats clonus with dorsiflexion, R wrist extension.         Data:   Scheduled meds    diazepam  10 mg Oral At Bedtime     docusate sodium  1 enema Rectal Daily     doxepin  150 mg Oral At Bedtime     DULoxetine  60 mg Oral Daily     guaiFENesin  600 mg Oral BID     hydrocortisone-pramoxine   Rectal BID     Lidocaine  1 patch Transdermal Q24H     lidocaine   Transdermal Q8H     lidocaine   Transdermal Q24h     multivitamin w/minerals  1 tablet Oral Daily     nitroFURantoin macrocrystal-monohydrate  100 mg Oral Q12H RANDI     pantoprazole  40 mg Oral Daily     polyethylene glycol  17 g Oral Daily     senna-docusate  1 tablet Oral QPM     topiramate  25 mg Oral BID     vitamin D3  2,000 Units Oral Daily       PRN meds:  acetaminophen, albuterol, baclofen, bisacodyl, docusate sodium, HYDROmorphone, lactulose, lidocaine, Medication given by intrathecal pump: This is NOT an order to dispense medication. For information only., miconazole, naloxone, nitroGLYcerin, nitroGLYcerin, phenylephrine, sodium chloride, SUMAtriptan      Ernesto Saldana MD  Physical Medicine and  Rehabilitation

## 2019-09-23 NOTE — PLAN OF CARE
VSS. Denied pain.  Neuros unchanged;O x 4, soft spoken. Slight right facial droop. Dysconjugate right eye at times. LUE 4/5, RUE 2/5, LLE 2/5 and RLE 1/5.  Good po intake, needs help with tray set up. Napoles with good output. Had BM today. Back incision dressing changed, abd incision BARBARA (slightly reddened from old dressing). Worked with both PT and OT. Repositioned in bed with cares, up with lift. Unsure of plan at this time. Pt is able to make needs known. Charge RN aware that patient would like to speak to NSG attending.

## 2019-09-23 NOTE — PLAN OF CARE
Status: Pt is incomplete quadriplegic, admitted w/ increased spasticity & concern of baclofen pump malfunction & UTI.  Vitals: VSS on RA.   Neuros: A&Ox4, R droop (previous stroke), L thigh numbness, lower back & R buttock numbness. LUE 4/5, LLE 3/5, RUE 2/5, RLE 1/5. Diminished hearing in R ear at baseline.   IV: PIV SL  Resp/trach: LS clear, diminished bases.   Diet: Regular diet. Adequate intake. Please assist with ordering meals since his voice is soft the kitchen cannot hear him.  Bowel status: No BM overnight. BS+.   : Napoles in for neurogenic bladder. Adequate UO.   Skin: Incision over abd. For baclofen pump & back incision w/ sutures from previous surgery.   Pain: Pt denied pain, rests comfortably between cares.    Activity: Worked with PT /OT today.  Social: No visitors this shift  Plan: Will continue to monitor and follow with POC.

## 2019-09-23 NOTE — PROGRESS NOTES
"Neurosurgery Progress Note    S: Improving rigidity in his right leg. Left leg continues to be very rigid.    O:  /61 (BP Location: Left arm)   Pulse 53   Temp 95.6  F (35.3  C) (Oral)   Resp 16   Ht 1.803 m (5' 11\")   Wt 94.8 kg (209 lb)   SpO2 98%   BMI 29.15 kg/m    Exam:  General: Awake;  Alert, In No Acute Distress  Pulm: Breathing Comfortably on room air  Mental status: Oriented x 3  Cranial Nerves: Cranial Nerves II-XII Intact Bilaterally, except Right sided facial weakness, able to blink with right eye but cannot raise right eyebrow or right lip. Hearing decreased in right ear due to history of trauma to the ear  Strength:   Modified Lakhwinder Scale: 4 in LLE, 3 in RLE, 3 in RUE, 1 in LUE  He is 2-3/5 in RUE which is his baseline, 5/5 in LUE, 0-1/5 in RLE, 2-3/5 in LLE  Sensory: Intact to Light Touch  Reflexes: 2+ in left brachial and left biceps, 0 in left brachial, left biceps, right and left patellar      Assessment:   53 year old man who presents with increased rigidity and spasticity in his extremities, more marked in his lower extremities, with no other signs to suggest baclofen withdrawal.    Plan:   Baclofen pump interrogation and dye study shows working pump  Follow up PM&R consult for Baclofen pump management as well  Will continue to increase baclofen pump rate and monitor for improving rigidity    Dorothy Deutsch MD  Neurosurgery Resident, PGY-1    I have reviewed the history above and agree with the resident's assessment and plan.  KELTON Jeffrey MD    "

## 2019-09-24 ENCOUNTER — APPOINTMENT (OUTPATIENT)
Dept: PHYSICAL THERAPY | Facility: CLINIC | Age: 54
DRG: 091 | End: 2019-09-24
Attending: NEUROLOGICAL SURGERY
Payer: COMMERCIAL

## 2019-09-24 ENCOUNTER — APPOINTMENT (OUTPATIENT)
Dept: OCCUPATIONAL THERAPY | Facility: CLINIC | Age: 54
DRG: 091 | End: 2019-09-24
Attending: NEUROLOGICAL SURGERY
Payer: COMMERCIAL

## 2019-09-24 VITALS
HEIGHT: 71 IN | BODY MASS INDEX: 29.26 KG/M2 | SYSTOLIC BLOOD PRESSURE: 110 MMHG | RESPIRATION RATE: 16 BRPM | DIASTOLIC BLOOD PRESSURE: 67 MMHG | OXYGEN SATURATION: 97 % | TEMPERATURE: 96.7 F | WEIGHT: 209 LBS | HEART RATE: 75 BPM

## 2019-09-24 LAB
ANION GAP SERPL CALCULATED.3IONS-SCNC: 8 MMOL/L (ref 3–14)
BASOPHILS # BLD AUTO: 0.2 10E9/L (ref 0–0.2)
BASOPHILS NFR BLD AUTO: 1.4 %
BUN SERPL-MCNC: 12 MG/DL (ref 7–30)
CALCIUM SERPL-MCNC: 8.8 MG/DL (ref 8.5–10.1)
CHLORIDE SERPL-SCNC: 107 MMOL/L (ref 94–109)
CO2 SERPL-SCNC: 24 MMOL/L (ref 20–32)
CREAT SERPL-MCNC: 0.8 MG/DL (ref 0.66–1.25)
DIFFERENTIAL METHOD BLD: ABNORMAL
EOSINOPHIL # BLD AUTO: 0.7 10E9/L (ref 0–0.7)
EOSINOPHIL NFR BLD AUTO: 6.2 %
ERYTHROCYTE [DISTWIDTH] IN BLOOD BY AUTOMATED COUNT: 13.6 % (ref 10–15)
GFR SERPL CREATININE-BSD FRML MDRD: >90 ML/MIN/{1.73_M2}
GLUCOSE SERPL-MCNC: 95 MG/DL (ref 70–99)
HCT VFR BLD AUTO: 42.1 % (ref 40–53)
HGB BLD-MCNC: 13.4 G/DL (ref 13.3–17.7)
IMM GRANULOCYTES # BLD: 0 10E9/L (ref 0–0.4)
IMM GRANULOCYTES NFR BLD: 0.2 %
LYMPHOCYTES # BLD AUTO: 5.3 10E9/L (ref 0.8–5.3)
LYMPHOCYTES NFR BLD AUTO: 47 %
MAGNESIUM SERPL-MCNC: 2.3 MG/DL (ref 1.6–2.3)
MCH RBC QN AUTO: 29.6 PG (ref 26.5–33)
MCHC RBC AUTO-ENTMCNC: 31.8 G/DL (ref 31.5–36.5)
MCV RBC AUTO: 93 FL (ref 78–100)
MONOCYTES # BLD AUTO: 0.9 10E9/L (ref 0–1.3)
MONOCYTES NFR BLD AUTO: 7.8 %
NEUTROPHILS # BLD AUTO: 4.2 10E9/L (ref 1.6–8.3)
NEUTROPHILS NFR BLD AUTO: 37.4 %
NRBC # BLD AUTO: 0 10*3/UL
NRBC BLD AUTO-RTO: 0 /100
PHOSPHATE SERPL-MCNC: 3.1 MG/DL (ref 2.5–4.5)
PLATELET # BLD AUTO: 341 10E9/L (ref 150–450)
PLATELET # BLD EST: ABNORMAL 10*3/UL
POTASSIUM SERPL-SCNC: 3.9 MMOL/L (ref 3.4–5.3)
RBC # BLD AUTO: 4.52 10E12/L (ref 4.4–5.9)
SODIUM SERPL-SCNC: 140 MMOL/L (ref 133–144)
WBC # BLD AUTO: 11.3 10E9/L (ref 4–11)

## 2019-09-24 PROCEDURE — 25000132 ZZH RX MED GY IP 250 OP 250 PS 637: Performed by: STUDENT IN AN ORGANIZED HEALTH CARE EDUCATION/TRAINING PROGRAM

## 2019-09-24 PROCEDURE — 85025 COMPLETE CBC W/AUTO DIFF WBC: CPT | Performed by: STUDENT IN AN ORGANIZED HEALTH CARE EDUCATION/TRAINING PROGRAM

## 2019-09-24 PROCEDURE — 97530 THERAPEUTIC ACTIVITIES: CPT | Mod: GO

## 2019-09-24 PROCEDURE — 83735 ASSAY OF MAGNESIUM: CPT | Performed by: STUDENT IN AN ORGANIZED HEALTH CARE EDUCATION/TRAINING PROGRAM

## 2019-09-24 PROCEDURE — 80048 BASIC METABOLIC PNL TOTAL CA: CPT | Performed by: STUDENT IN AN ORGANIZED HEALTH CARE EDUCATION/TRAINING PROGRAM

## 2019-09-24 PROCEDURE — 97530 THERAPEUTIC ACTIVITIES: CPT | Mod: GP

## 2019-09-24 PROCEDURE — 36415 COLL VENOUS BLD VENIPUNCTURE: CPT | Performed by: STUDENT IN AN ORGANIZED HEALTH CARE EDUCATION/TRAINING PROGRAM

## 2019-09-24 PROCEDURE — 84100 ASSAY OF PHOSPHORUS: CPT | Performed by: STUDENT IN AN ORGANIZED HEALTH CARE EDUCATION/TRAINING PROGRAM

## 2019-09-24 PROCEDURE — 97110 THERAPEUTIC EXERCISES: CPT | Mod: GP

## 2019-09-24 PROCEDURE — 97110 THERAPEUTIC EXERCISES: CPT | Mod: GO

## 2019-09-24 PROCEDURE — 25000132 ZZH RX MED GY IP 250 OP 250 PS 637: Performed by: NEUROLOGICAL SURGERY

## 2019-09-24 PROCEDURE — 25000132 ZZH RX MED GY IP 250 OP 250 PS 637: Performed by: NURSE PRACTITIONER

## 2019-09-24 RX ORDER — BACLOFEN 10 MG/1
20 TABLET ORAL 4 TIMES DAILY
Status: DISCONTINUED | OUTPATIENT
Start: 2019-09-24 | End: 2019-09-24 | Stop reason: HOSPADM

## 2019-09-24 RX ORDER — NITROFURANTOIN 25; 75 MG/1; MG/1
100 CAPSULE ORAL EVERY 12 HOURS
Qty: 4 CAPSULE | Refills: 0 | Status: SHIPPED | OUTPATIENT
Start: 2019-09-24 | End: 2019-09-26

## 2019-09-24 RX ORDER — AMOXICILLIN 250 MG
1 CAPSULE ORAL EVERY EVENING
COMMUNITY
Start: 2019-09-24

## 2019-09-24 RX ADMIN — PRAMOXINE HYDROCHLORIDE HYDROCORTISONE ACETATE: 100; 100 AEROSOL, FOAM TOPICAL at 07:46

## 2019-09-24 RX ADMIN — MULTIPLE VITAMINS W/ MINERALS TAB 1 TABLET: TAB at 07:44

## 2019-09-24 RX ADMIN — TOPIRAMATE 25 MG: 25 TABLET, FILM COATED ORAL at 07:44

## 2019-09-24 RX ADMIN — GUAIFENESIN 600 MG: 600 TABLET ORAL at 07:44

## 2019-09-24 RX ADMIN — MELATONIN 2000 UNITS: at 07:44

## 2019-09-24 RX ADMIN — NITROFURANTOIN (MONOHYDRATE/MACROCRYSTALS) 100 MG: 75; 25 CAPSULE ORAL at 07:44

## 2019-09-24 RX ADMIN — BACLOFEN 20 MG: 10 TABLET ORAL at 07:44

## 2019-09-24 RX ADMIN — DOCUSATE SODIUM 1 ENEMA: 283 LIQUID RECTAL at 07:46

## 2019-09-24 RX ADMIN — DULOXETINE HYDROCHLORIDE 60 MG: 60 CAPSULE, DELAYED RELEASE ORAL at 07:44

## 2019-09-24 RX ADMIN — HYDROMORPHONE HYDROCHLORIDE 6 MG: 4 TABLET ORAL at 14:41

## 2019-09-24 RX ADMIN — PANTOPRAZOLE SODIUM 40 MG: 40 TABLET, DELAYED RELEASE ORAL at 07:44

## 2019-09-24 RX ADMIN — BACLOFEN 20 MG: 10 TABLET ORAL at 12:33

## 2019-09-24 ASSESSMENT — ACTIVITIES OF DAILY LIVING (ADL)
ADLS_ACUITY_SCORE: 28
ADLS_ACUITY_SCORE: 26

## 2019-09-24 ASSESSMENT — PAIN DESCRIPTION - DESCRIPTORS: DESCRIPTORS: ACHING

## 2019-09-24 NOTE — PLAN OF CARE
VSS. C/O generalized pain, baclofen given x 2.  Neuros unchanged;O x 4, soft spoken. Slight right facial droop. Dysconjugate right eye at times. LUE 4/5, RUE 2/5, LLE 2/5 and RLE 1/5.  Good po intake, needs help with tray set up. Napoles with good output. Had BM today. Back incision BARBARA, abd incision BARBARA (slightly reddened from old dressing). Worked with both PT and OT. Repositioned in bed with cares, up with lift. [facility]     **Pt discharging around 1500 via [facility] transportation, [facility].

## 2019-09-24 NOTE — DISCHARGE SUMMARY
[facility] Discharge Summary and Instructions    Osvaldo Baxter MRN# [id number]   Age: 53 year old YOB: 1965     Date of Admission:  9/19/2019  Date of Discharge::  9/24/2019  Admitting Physician:  Phil Mckeon MD  Discharge Physician:  Phil Mckeon MD          Admission Diagnoses:   Intrathecal Baclofen Withrawal  Spasticity          Discharge Diagnosis:   Intrathecal Baclofen Withrawal  Spasticity          Procedures:   None            Brief History of Illness:   Osvaldo Baxter is a 53 year old male with a past medical history of an accident causing cervical spinal cord injury in 2005 causing tetraplegia and spasticity s/p baclofen pump placement around that time with multiple recent revisions who [facility] of possible [facility]ofen withdrawal. At baseline he has spasticity in his right arm and leg.     For the past 18 months he has noticed increased spasticity. He originally presented to an outside hospital in [place] May of this year where they were concerned of baclofen withdrawal and interrogated the pump. They deemed it necessary to revise the pump. The pump itself was replaced but the original catheter was left in place. He continued to have increased spasticity as well as a UTI that led to sepsis during that hospitalization. Eventually, when he was medically stabilized, [facility] [place] [facility] of continued [facility] withdrawal. Several studies were done to assess the integrity of the pump including dye studies that showed the pump was working and the catheter was patent allowing intrathecal administration of the contents in the pump. For concerns of continued increased spasticity, he was taken to the OR on 9/10/19 where his IT catheter was interrogated, the pump aspirated CSF and the tubing appeared to be intact. There was no replacement of the catheter  "at that time. The [facility] and a transfer here was set up to assess for baclofen withdrawal as well as possible replacement of the pump catheter.     He states he has increased spasticity in his right leg. He states he never used to have spasticity in his left leg but more recently has spasticity that is significant in his left leg. He has his baseline spasticity in his right arm and no subjective spasticity in his left arm. He states his back hurts and now has spasticity though he never had spasticity in his back before. He denies itching, or trouble with temperature regulation. He states his only symptoms are his increased \"tone\" which has more recently aggravated his sciatica in his left leg.           Hospital Course:     Following admission the patient had pump interrogated and dye study which showed a properly working baclofen pump.  Therefore no neurosurgical intervention was recommended.   PM&R followed the patient closely.  There recommendations listed as below:   On interrogation today, the pump shows a concentration of 1000 mcg/mL, a rate of 150.4 mcg/day, simple continuous infusion, and an alarm date of 10/10/19. Reservoir volume is 4.7 mL.   His rate was increased to 164.9 mcg/mL. After the change the concentration was still 1000 mcg/mL, the rate was 164.9 mcg/day, simple continuous infusion, with an alarm date of 10/9/19. Reservoir volume is 4.7 mL.  His baclofen was changed from 20mg PO PRN to scheduled four times daily.          Discharge Medications:     Current Discharge Medication List      START taking these medications    Details   nitroFURantoin macrocrystal-monohydrate (MACROBID) 100 MG capsule Take 1 capsule (100 mg) by mouth every 12 hours for 2 days  Qty: 4 capsule, Refills: 0    Associated Diagnoses: Spasticity      !! senna-docusate (SENOKOT-S/PERICOLACE) 8.6-50 MG tablet Take 1 tablet by mouth every evening       !! - Potential duplicate medications " found. Please discuss with provider.      CONTINUE these medications which have NOT CHANGED    Details   acetaminophen (TYLENOL) 325 MG tablet Take 1-2 tablets by mouth every 6 hours as needed.  Qty: 250 tablet, Refills: 0    Associated Diagnoses: Postoperative pain; Tear of PCL (posterior cruciate ligament) of knee      baclofen (LIORESAL) 20 MG tablet Take 20 mg by mouth 4 times daily as needed for muscle spasms (Breakthrough spasms)      diazepam (VALIUM) 10 MG tablet Take 10 mg by mouth At Bedtime      docusate sodium (ENEMEEZ) 283 MG enema Place 1 enema rectally daily.  Qty: 30 each    Associated Diagnoses: Chronic constipation      doxepin HCl (SINEQUAN) 150 MG capsule Take 150 mg by mouth At Bedtime      DULOXETINE HCL PO Take 60 mg by mouth daily.        enoxaparin (LOVENOX) 80 MG/0.8ML syringe Inject 80 mg Subcutaneous every 12 hours      guaiFENesin (MUCINEX) 600 MG 12 hr tablet Take 600 mg by mouth 2 times daily       hydrocortisone-pramoxine (PROCTOFOAM-HC) rectal foam Place 1 Applicatorful rectally 2 times daily      HYDROmorphone (DILAUDID) 2 MG tablet Take 6 mg by mouth 2 times daily as needed for severe pain      lactulose 20 GM/30ML SOLN Take 30 mLs by mouth 2 times daily as needed      miconazole (MICATIN) 2 % AERP powder Apply topically 2 times daily as needed      Multiple Vitamin (MULTI-VITAMIN) per tablet Take 1 tablet by mouth daily.        nitroGLYcerin (NITRO-BID) 2 % OINT ointment Place 1 inch onto the skin as needed for chest pain      pantoprazole (PROTONIX) 40 MG EC tablet Take 40 mg by mouth daily      phenylephrine (TRONOLANE) 0.25 % suppository Place 1 suppository rectally daily as needed for hemorrhoids      polyethylene glycol (MIRALAX) powder Take 1 capful by mouth daily       !! senna-docusate (SENOKOT-S/PERICOLACE) 8.6-50 MG tablet Take 1 tablet by mouth every evening      sodium chloride (OCEAN) 0.65 % nasal spray Spray 2 sprays into both nostrils every 2 hours as needed for  "congestion      SUMAtriptan (IMITREX) 100 MG tablet Take 100 mg by mouth 2 times daily as needed for migraine      topiramate (TOPAMAX) 25 MG tablet Take 25 mg by mouth 2 times daily      VITAMIN D, CHOLECALCIFEROL, PO Take 2,000 Units by mouth daily       albuterol (2.5 MG/3ML) 0.083% nebulizer solution Take 3 mLs by nebulization every 4 hours as needed (dyspnea).  Qty: 1 Box    Associated Diagnoses: Hospital-acquired pneumonia      bisacodyl (DULCOLAX) 10 MG suppository Place 1 suppository rectally daily as needed.  Qty: 12 suppository    Associated Diagnoses: Chronic constipation      Warfarin Therapy Reminder 1 each continuous prn.  Qty: 1 each, Refills: 0    Comments: Continue chronic warfarin.  INR goal 2-3.  Indication: Recurrent DVT/PE.  Associated Diagnoses: DVT, recurrent, lower extremity, chronic (H)       !! - Potential duplicate medications found. Please discuss with provider.      STOP taking these medications       DOCUSATE SODIUM PO Comments:   Reason for Stopping:         Lidocaine (LIDOCARE) 4 % Patch Comments:   Reason for Stopping:         lidocaine (XYLOCAINE) 2 % external gel Comments:   Reason for Stopping:         MEDICATION GIVEN BY INTRATHECAL PUMP - INSTRUCTION Comments:   Reason for Stopping:         pramoxine (PROCTOFOAM) 1 % foam Comments:   Reason for Stopping:           O:  /67 (BP Location: Left arm)   Pulse 75   Temp 96.7  F (35.9  C) (Oral)   Resp 16   Ht 1.803 m (5' 11\")   Wt 94.8 kg (209 lb)   SpO2 97%   BMI 29.15 kg/m     Exam:  General: Awake;  Alert, In No Acute Distress  Pulm: Breathing Comfortably on room air  Mental status: Oriented x 3  Cranial Nerves: Cranial Nerves II-XII Intact Bilaterally, except Right sided facial weakness, able to blink with right eye but cannot raise right eyebrow or right lip. Hearing decreased in right ear due to history of trauma to the ear  Strength:   Modified Lakhwinder Scale: 4 in LLE, 3 in RLE, 3 in RUE, 1 in LUE  He is 2-3/5 in " RUE which is his baseline, 5/5 in LUE, 0-1/5 in RLE, 2-3/5 in LLE  Sensory: Intact to Light Touch  Reflexes: 2+ in left brachial and left biceps, 0 in left brachial, left biceps, right and left patellar            Discharge Instructions and Follow-Up:   Discharge diet: Regular   Discharge activity: You may advance activity as tolerated. No strenuous exercise or heay lifting greater than 10 lbs for 4 weeks or until seen and cleared in clinic.   Discharge follow-up: [facility]    Wound care: Ok to shower,however no scrubbing of the wound and no soaking of the wound, meaning no bathtubs or swimming pools. Pat dry only. Leave wound open to air.  Sutures are not absorbable and need to be removed in 2 weeks. If patient still at rehab by this time, the sutures may be removed by the rehab physician if he or she considers that the wound has healed completely.     Please call if you have:  1. increased pain, redness, drainage, swelling at your incision  2. fevers > 101.5 F degrees  3. with any questions or concerns.  You may reach the Neurosurgery clinic at [phone] during regular work hours. ER at [phone].    and ask for the Neurosurgery Resident on call at [phone], during off hours or weekends.         Discharge Disposition:   Discharged to home        WYATT Masters, CNP  Department of Neurosurgery  Pager: [phone]

## 2019-09-24 NOTE — PLAN OF CARE
6A Discharge Planner PT   [facility]  Current status: Dependent donning of B shoes and AFO (encouraged by PT). Engaged in B LE stretching and ROM prior to maximize ability to participate in mobility - required PROM through L LE, and AAROM for R LE. Progressed bed mobility (contact guard assist x 1 with use of bed rails and HOB elevated), 2 x STSs (contact guard assist x 2), and initiated and progressed stepping at EOB with weight shifts, elsa walker in L UE, mod A x 2 required for balance. Continues to demonstrated increased B LE tone/spasticity (L>R). Unable to demonstrate forward L LE forward progression 2/2 tone/spasticity. Pt demonstrates that he is a falls risk. Recommend nursing utilize OH ceiling lift for transfers.     Barriers to return to prior living situation: Medical status, current mob status (unable to ambulate), level of A, weakness, tone/spasticity, balance, falls risk  Recommendations for discharge: [facility]  Rationale for recommendations: Pt would greatly benefit to return to ARU to improve strength, LE tone reduction, transfers, and ambulation to prepare pt for return home.          Entered by: Cheli Wayne 09/24/2019 4:04 PM

## 2019-09-24 NOTE — PLAN OF CARE
OT 6A  Discharge Planner OT   Patient plan for discharge: [facility]  Current status: Mod A supine<>EOB. CGA for a slide board transfer from EOB to wc. Min A wc to EOB. Pt completed PROM/AAROM to RUE while supine.  Barriers to return to prior living situation: fatigue, weakness, acute medical needs  Recommendations for discharge: [facility]  Rationale for recommendations: Pt is below baseline and would benefit from continued skilled therapy to increase activity tolerance and independence with ADLs. Pt is motivated, has a good support system and is making good progress with therapy. Pt is able to tolerate 3 hours of therapy per day.        Entered by: Lizbet Ferrera 09/24/2019 10:49 AM      Occupational Therapy Discharge Summary    Reason for therapy discharge:    Discharged to acute rehabilitation facility.    Progress towards therapy goal(s). See goals on Care Plan in [facility] electronic health record for goal details.  Goals partially met.  Barriers to achieving goals:   discharge from facility.    Therapy recommendation(s):    Continued therapy is recommended.  Rationale/Recommendations:  to increase activity tolerance and independence with ADLs.

## 2019-09-24 NOTE — PLAN OF CARE
Status: Pt incomplete quadriplegic, admitted w/ baclofen pump issues.  Vitals: VSS.   Neuros: A/O x4. Slight R droop, R eye dysconjugate, R ear [place]. Slight L tongue deviation. LUE 4/5, LLE 2/5, RUE 2/5, RLE 1/5. N/T to LE (worse on RLE). Numbness to R side of body.   IV: PIV SL.   Resp/trach: RA O2 sats stable, lungs clear.   Diet: Regular diet, tolerating it well.   Bowel status: BS active, bowel med regimen followed.   : Napoles in place (neurogenic bladder). Good output.   Skin: Abd incision WDL, back incision w/ primapore dressing in place.    Pain: Intermittent spasms and pain in LE managed w/ PRN medications. Baclofen given x2, Dilaudid given x2 this shift w/ some relief.    Activity: Up w/2 and lift. Pt refuses q2h repositioning.   Plan: Pt would like to speak with neurosurgery attending. Continue to monitor and follow POC.

## 2019-09-24 NOTE — PLAN OF CARE
Physical Therapy Discharge Summary    Reason for therapy discharge:    Discharged to acute rehabilitation facility.    Progress towards therapy goal(s). See goals on Care Plan in [facility] electronic health record for goal details.  Goals partially met.  Barriers to achieving goals:   discharge from facility.    Therapy recommendation(s):    Continued therapy is recommended.  Rationale/Recommendations:  to progress functional mobility.

## 2019-09-24 NOTE — PLAN OF CARE
Pt incomplete quadriplegic, admitted w/ baclofen pump issues. VSS on RA, soft BP's this shift within parameters. A&Ox4. Neuros include: R. eye dysconjugate at times, R. droop, slight L. tongue deviation, R. ear [place], LUE 3/5, RUE 1-2/5, LLE 1-2/5, RLE 1/5, N/T t/o all extremities > on R side. Pt. refused 0400 neuro/vital check this shift. Tolerating regular diet. Napoles catheter in place for neurogenic bladder with adequate UO. No BM this shift. Up A2/lift, pt. refusing repositioning. L. PIV, SL. Abdominal and back incisions covered with primapore dressing. C/o back pain, PRN tylenol offered but pt. Declined; pt. Refused 0400 neuro/vital assessment so PRN baclofen and dilaudid not given as pt. Was sleeping when available and pt. Rested comfortably throughout night. Pt would like to speak with neurosurgery attending. Continue to monitor and follow POC.

## 2019-09-24 NOTE — PROGRESS NOTES
Care Coordinator - Discharge Planning    Admission Date/Time:  9/19/2019  Attending MD:  Phil Mckeon,*     Data  Patient was admitted for: No diagnosis found.     Coordination of Care  Chart reviewed, discussed with interdisciplinary team. Plan for patient to transfer back to [facility]. [facility] ([phone][facility] to [facility]. Per [facility],  will be assigned and review the case and will call back to discuss.     12:30  Sp[facility] with [facility] Hwang CM at [facility] ([phone]). [facility]. Patient will require stretcher transport. Sandy will call back with information about transport time. (need to send with transport: H/P, consult note, recent progress note, AVS). Team, charge, bedside RN updated.    14:15  Spoke with MENDY Delgado CM. [facility] will provide transport. They will  patient around 3PM and will call charge desk when on route. Sandy also requested any imaging done this admission to be sent with patient. Placed STAT request to film (*[id number]) to have this completed prior to discharge. Charge RN updated.      Plan  Anticipated Discharge Date:  9/24/19  Anticipated Discharge Plan:  Transfer back to [facility].       Stella Morel RNCC

## 2019-09-24 NOTE — PROGRESS NOTES
[facility], [place]   Physical Medicine and Rehabilitation Daily Note         Assessment and Plan of Care:   Mr. Baxter is a 52 yo male with history of C1 incomplete tetraplegia, who presented with increased spasticity and declined function secondary to it. He continues to have ongoing increased tone affecting his right arm and both legs despite increased ITB dose.     On interrogation today, the pump shows a concentration of 1000 mcg/mL, a rate of 164.9 mcg/day, simple continuous infusion, and an alarm date of 10/9/19. Reservoir volume is 4.5 mL.    His rate was increased to 189.2 mcg/mL. After the change the concentration was still 1000 mcg/mL, the rate was 189.2 mcg/day, simple continuous infusion, with an alarm date of 10/07/19. Reservoir volume is 4.5 mL.    We also changed his baclofen from 20mg PO PRN to scheduled four times daily.    We will continue to follow this patient and increase his rate, with target rate of 225 mcg/day.         Interval history:   Patient last seen by PM&R yesterday with increase in pump rate to 164.9 mcg/day. He has not noticed significant change with this increase. Still feels very tight in bilateral legs, says right arm is worse today. No signs of baclofen withdrawal or intoxication. Currently voiding via Napoles.Receiving dilaudid for pain.           Physical Exam:     Vitals:    09/23/19 1534 09/23/19 1940 09/24/19 0000 09/24/19 0728   BP: 120/76 126/66 93/57 105/64   BP Location: Left arm  Left arm Left arm   Pulse: 82 90  62   Resp: 16 16 16 18   Temp: 98.5  F (36.9  C) 98.6  F (37  C) 95.9  F (35.5  C) 96.2  F (35.7  C)   TempSrc: Oral Oral Oral Oral   SpO2: 97% 94% 96% 96%   Weight:       Height:         Gen: Appears comfortable, resting in bed  Ext: PRAFO on B/L LE  MSK/neuro: alert and oriented. speech fluent. Tone increased in both legs, R arm. Modified Lakhwinder Scale: 4 in LLE to dorsiflexion and knee flexion. 4 in RLE to dorsiflexion and knee  flexion/extension. 3 in RUE to elbow extension. 1 in LUE. Multiple beats clonus with dorsiflexion, R wrist extension.         Data:   Scheduled meds    diazepam  10 mg Oral At Bedtime     docusate sodium  1 enema Rectal Daily     doxepin  150 mg Oral At Bedtime     DULoxetine  60 mg Oral Daily     guaiFENesin  600 mg Oral BID     hydrocortisone-pramoxine   Rectal BID     Lidocaine  1 patch Transdermal Q24H     lidocaine   Transdermal Q8H     lidocaine   Transdermal Q24h     multivitamin w/minerals  1 tablet Oral Daily     nitroFURantoin macrocrystal-monohydrate  100 mg Oral Q12H RANDI     pantoprazole  40 mg Oral Daily     polyethylene glycol  17 g Oral Daily     senna-docusate  1 tablet Oral QPM     topiramate  25 mg Oral BID     vitamin D3  2,000 Units Oral Daily       PRN meds:  acetaminophen, albuterol, baclofen, bisacodyl, docusate sodium, HYDROmorphone, lactulose, lidocaine, Medication given by intrathecal pump: This is NOT an order to dispense medication. For information only., miconazole, naloxone, nitroGLYcerin, nitroGLYcerin, phenylephrine, sodium chloride, SUMAtriptan      Ernesto Saldana MD  Physical Medicine and Rehabilitation

## 2020-01-01 NOTE — PLAN OF CARE
PT -   Discharge Planner PT   Patient plan for discharge: Not discussed   Current status: Eval complete, tx indicated. Pt was able to transfer from supine> sit with min Ax2. Pt demonstrated good strategies for bed mobility but needs to increase strength in order to complete indp. Pt was able to sit at EOB with good trunk control SBA. Pt has tendency to lean towards R side when seated. Pt was able to complete STS with SBA-Min Ax2. Pt reported slight dizziness during transfers but resolved quickly. Pt completed standing pivot transfer min-mod Ax 2. Pt was limited in strength and has increased tone/spasticity (L Hip flexors, H/S, DF&PF) which is limited his ability to transfer safely.   Barriers to return to prior living situation: current functional mobility, medical status, muscle tone, spasticity, strength deficits  Recommendations for discharge: pending ongoing progress and management of new deficits - ARU   Rationale for recommendations: Pt is currently below functional baseline. Pt has increased tone/spasticity within the last 6 months which is limiting his ability to complete ADL's at prior baseline. Pt is in need of skilled PT to address impairments and can handle 3 hrs of therapy required of ARU.    Entered by: CYDNEY RABAGO 09/20/2019 1:29 PM        8